# Patient Record
Sex: FEMALE | Race: WHITE | Employment: FULL TIME | URBAN - METROPOLITAN AREA
[De-identification: names, ages, dates, MRNs, and addresses within clinical notes are randomized per-mention and may not be internally consistent; named-entity substitution may affect disease eponyms.]

---

## 2023-06-19 LAB
ALANINE AMINOTRANSFERASE (SGPT) (U/L) IN SER/PLAS: 60 U/L (ref 7–45)
ALBUMIN (G/DL) IN SER/PLAS: 4.5 G/DL (ref 3.4–5)
ALKALINE PHOSPHATASE (U/L) IN SER/PLAS: 68 U/L (ref 33–136)
ANION GAP IN SER/PLAS: 12 MMOL/L (ref 10–20)
ASPARTATE AMINOTRANSFERASE (SGOT) (U/L) IN SER/PLAS: 43 U/L (ref 9–39)
BILIRUBIN TOTAL (MG/DL) IN SER/PLAS: 1.4 MG/DL (ref 0–1.2)
CALCIUM (MG/DL) IN SER/PLAS: 10 MG/DL (ref 8.6–10.6)
CARBON DIOXIDE, TOTAL (MMOL/L) IN SER/PLAS: 28 MMOL/L (ref 21–32)
CHLORIDE (MMOL/L) IN SER/PLAS: 103 MMOL/L (ref 98–107)
CHOLESTEROL (MG/DL) IN SER/PLAS: 171 MG/DL (ref 0–199)
CHOLESTEROL IN HDL (MG/DL) IN SER/PLAS: 48.5 MG/DL
CHOLESTEROL/HDL RATIO: 3.5
CREATININE (MG/DL) IN SER/PLAS: 0.6 MG/DL (ref 0.5–1.05)
ESTIMATED AVERAGE GLUCOSE FOR HBA1C: 197 MG/DL
GFR FEMALE: >90 ML/MIN/1.73M2
GLUCOSE (MG/DL) IN SER/PLAS: 171 MG/DL (ref 74–99)
HEMOGLOBIN A1C/HEMOGLOBIN TOTAL IN BLOOD: 8.5 %
LDL: 89 MG/DL (ref 0–99)
POTASSIUM (MMOL/L) IN SER/PLAS: 4.3 MMOL/L (ref 3.5–5.3)
PROTEIN TOTAL: 7.2 G/DL (ref 6.4–8.2)
SODIUM (MMOL/L) IN SER/PLAS: 139 MMOL/L (ref 136–145)
TRIGLYCERIDE (MG/DL) IN SER/PLAS: 169 MG/DL (ref 0–149)
UREA NITROGEN (MG/DL) IN SER/PLAS: 16 MG/DL (ref 6–23)
VLDL: 34 MG/DL (ref 0–40)

## 2023-06-20 LAB — THYROTROPIN (MIU/L) IN SER/PLAS BY DETECTION LIMIT <= 0.05 MIU/L: 2.2 MIU/L (ref 0.44–3.98)

## 2023-06-22 LAB
ALBUMIN (MG/L) IN URINE: <7 MG/L
ALBUMIN/CREATININE (UG/MG) IN URINE: NORMAL UG/MG CRT (ref 0–30)
CREATININE (MG/DL) IN URINE: 131 MG/DL (ref 20–320)

## 2023-08-21 ENCOUNTER — HOSPITAL ENCOUNTER (OUTPATIENT)
Dept: DATA CONVERSION | Facility: HOSPITAL | Age: 63
End: 2023-08-21
Payer: COMMERCIAL

## 2023-08-21 DIAGNOSIS — M65.132: ICD-10-CM

## 2023-08-21 DIAGNOSIS — M65.332 TRIGGER FINGER, LEFT MIDDLE FINGER: ICD-10-CM

## 2023-08-28 PROBLEM — E78.5 HLD (HYPERLIPIDEMIA): Status: ACTIVE | Noted: 2023-08-28

## 2023-08-28 PROBLEM — G56.02 CARPAL TUNNEL SYNDROME, LEFT UPPER LIMB: Status: ACTIVE | Noted: 2023-08-28

## 2023-08-28 PROBLEM — G56.01 CARPAL TUNNEL SYNDROME, RIGHT UPPER LIMB: Status: ACTIVE | Noted: 2023-08-28

## 2023-08-28 PROBLEM — G56.22 CUBITAL TUNNEL SYNDROME ON LEFT: Status: ACTIVE | Noted: 2023-08-28

## 2023-08-28 PROBLEM — A49.01 METHICILLIN SUSCEPTIBLE STAPHYLOCOCCUS AUREUS INFECTION: Status: ACTIVE | Noted: 2023-08-28

## 2023-08-28 PROBLEM — E78.5 DYSLIPIDEMIA: Status: ACTIVE | Noted: 2023-08-28

## 2023-08-28 PROBLEM — E66.9 OBESITY: Status: ACTIVE | Noted: 2023-08-28

## 2023-08-28 PROBLEM — L71.9 ROSACEA: Status: ACTIVE | Noted: 2023-08-28

## 2023-08-28 PROBLEM — E11.9 DIABETES MELLITUS TYPE 2, UNCOMPLICATED (MULTI): Status: ACTIVE | Noted: 2023-08-28

## 2023-08-28 PROBLEM — G89.4 CHRONIC PAIN SYNDROME: Status: ACTIVE | Noted: 2023-08-28

## 2023-08-28 PROBLEM — E11.69 DIABETES MELLITUS TYPE 2 IN OBESE: Status: ACTIVE | Noted: 2023-08-28

## 2023-08-28 PROBLEM — I10 HTN (HYPERTENSION), BENIGN: Status: ACTIVE | Noted: 2023-08-28

## 2023-08-28 PROBLEM — M65.4 RADIAL STYLOID TENOSYNOVITIS: Status: ACTIVE | Noted: 2023-08-28

## 2023-08-28 PROBLEM — E66.9 DIABETES MELLITUS TYPE 2 IN OBESE: Status: ACTIVE | Noted: 2023-08-28

## 2023-08-28 RX ORDER — IBUPROFEN 600 MG/1
1 TABLET ORAL 3 TIMES DAILY PRN
COMMUNITY
Start: 2021-11-08 | End: 2023-10-24 | Stop reason: ALTCHOICE

## 2023-08-28 RX ORDER — GABAPENTIN 100 MG/1
100 CAPSULE ORAL
COMMUNITY
Start: 2022-10-31 | End: 2023-10-24 | Stop reason: ALTCHOICE

## 2023-08-28 RX ORDER — BENZONATATE 200 MG/1
200 CAPSULE ORAL 3 TIMES DAILY PRN
COMMUNITY
Start: 2022-03-28 | End: 2023-10-24 | Stop reason: ALTCHOICE

## 2023-08-28 RX ORDER — HYDROCODONE BITARTRATE AND ACETAMINOPHEN 5; 325 MG/1; MG/1
TABLET ORAL
COMMUNITY
Start: 2022-10-31 | End: 2023-10-24 | Stop reason: ALTCHOICE

## 2023-08-28 RX ORDER — BLOOD-GLUCOSE METER
KIT MISCELLANEOUS 2 TIMES DAILY
COMMUNITY

## 2023-08-28 RX ORDER — GABAPENTIN 300 MG/1
1 CAPSULE ORAL 3 TIMES DAILY
COMMUNITY
Start: 2022-12-01 | End: 2023-10-24 | Stop reason: ALTCHOICE

## 2023-08-28 RX ORDER — ACETAMINOPHEN 500 MG
500 TABLET ORAL EVERY 6 HOURS PRN
COMMUNITY
Start: 2021-11-08 | End: 2023-10-24 | Stop reason: ALTCHOICE

## 2023-08-28 RX ORDER — ATORVASTATIN CALCIUM 20 MG/1
1 TABLET, FILM COATED ORAL DAILY
COMMUNITY
Start: 2021-11-30 | End: 2023-11-15 | Stop reason: SDUPTHER

## 2023-08-28 RX ORDER — LOSARTAN POTASSIUM 50 MG/1
1 TABLET ORAL DAILY
COMMUNITY
Start: 2021-11-29 | End: 2023-11-15 | Stop reason: SDUPTHER

## 2023-08-28 RX ORDER — AZELASTINE 1 MG/ML
2 SPRAY, METERED NASAL DAILY
COMMUNITY
Start: 2022-03-28 | End: 2023-10-24 | Stop reason: ALTCHOICE

## 2023-08-28 RX ORDER — GUAIFENESIN 400 MG/1
400 TABLET ORAL EVERY 4 HOURS PRN
COMMUNITY
Start: 2022-03-28 | End: 2023-10-24 | Stop reason: ALTCHOICE

## 2023-08-28 RX ORDER — DULAGLUTIDE 0.75 MG/.5ML
0.75 INJECTION, SOLUTION SUBCUTANEOUS
COMMUNITY
Start: 2022-03-10 | End: 2023-10-19 | Stop reason: ALTCHOICE

## 2023-08-28 RX ORDER — DULAGLUTIDE 1.5 MG/.5ML
1.5 INJECTION, SOLUTION SUBCUTANEOUS
COMMUNITY
Start: 2021-12-27 | End: 2023-10-19 | Stop reason: ALTCHOICE

## 2023-08-28 RX ORDER — FLUTICASONE PROPIONATE 50 MCG
2 SPRAY, SUSPENSION (ML) NASAL DAILY
COMMUNITY
Start: 2022-03-28 | End: 2023-10-24 | Stop reason: ALTCHOICE

## 2023-09-19 PROBLEM — R09.89 CHEST CONGESTION: Status: ACTIVE | Noted: 2023-09-19

## 2023-09-19 PROBLEM — R09.89 CHEST CONGESTION: Status: RESOLVED | Noted: 2023-09-19 | Resolved: 2023-09-19

## 2023-09-19 PROBLEM — E08.3292: Status: ACTIVE | Noted: 2023-09-19

## 2023-09-19 PROBLEM — H25.13 CATARACT, NUCLEAR SCLEROTIC, BOTH EYES: Status: ACTIVE | Noted: 2023-09-19

## 2023-09-19 PROBLEM — E11.9 DIABETES MELLITUS (MULTI): Status: RESOLVED | Noted: 2023-09-19 | Resolved: 2023-09-19

## 2023-09-19 PROBLEM — H25.013 CORTICAL AGE-RELATED CATARACT OF BOTH EYES: Status: ACTIVE | Noted: 2023-09-19

## 2023-09-19 PROBLEM — E11.9 DIABETES MELLITUS (MULTI): Status: ACTIVE | Noted: 2023-09-19

## 2023-09-19 PROBLEM — E08.3292: Status: RESOLVED | Noted: 2023-09-19 | Resolved: 2023-09-19

## 2023-09-19 PROBLEM — H25.13 CATARACT, NUCLEAR SCLEROTIC, BOTH EYES: Status: RESOLVED | Noted: 2023-09-19 | Resolved: 2023-09-19

## 2023-09-19 RX ORDER — TIRZEPATIDE 2.5 MG/.5ML
INJECTION, SOLUTION SUBCUTANEOUS
COMMUNITY
Start: 2023-07-03 | End: 2023-10-19

## 2023-09-19 RX ORDER — SEMAGLUTIDE 0.68 MG/ML
INJECTION, SOLUTION SUBCUTANEOUS
COMMUNITY
Start: 2023-07-11 | End: 2023-11-15 | Stop reason: SDUPTHER

## 2023-10-17 ENCOUNTER — TELEPHONE (OUTPATIENT)
Dept: ENDOCRINOLOGY | Facility: CLINIC | Age: 63
End: 2023-10-17

## 2023-10-17 ENCOUNTER — LAB (OUTPATIENT)
Dept: LAB | Facility: LAB | Age: 63
End: 2023-10-17
Payer: COMMERCIAL

## 2023-10-17 DIAGNOSIS — E11.9 TYPE 2 DIABETES MELLITUS WITHOUT COMPLICATION, UNSPECIFIED WHETHER LONG TERM INSULIN USE (MULTI): ICD-10-CM

## 2023-10-17 DIAGNOSIS — E11.9 TYPE 2 DIABETES MELLITUS WITHOUT COMPLICATION, WITHOUT LONG-TERM CURRENT USE OF INSULIN (MULTI): ICD-10-CM

## 2023-10-17 DIAGNOSIS — E11.9 TYPE 2 DIABETES MELLITUS WITHOUT COMPLICATION, WITHOUT LONG-TERM CURRENT USE OF INSULIN (MULTI): Primary | ICD-10-CM

## 2023-10-17 LAB
ALBUMIN SERPL BCP-MCNC: 4.8 G/DL (ref 3.4–5)
ALP SERPL-CCNC: 53 U/L (ref 33–136)
ALT SERPL W P-5'-P-CCNC: 37 U/L (ref 7–45)
ANION GAP SERPL CALC-SCNC: 14 MMOL/L (ref 10–20)
AST SERPL W P-5'-P-CCNC: 33 U/L (ref 9–39)
BILIRUB SERPL-MCNC: 1.1 MG/DL (ref 0–1.2)
BUN SERPL-MCNC: 15 MG/DL (ref 6–23)
CALCIUM SERPL-MCNC: 10.3 MG/DL (ref 8.6–10.6)
CHLORIDE SERPL-SCNC: 102 MMOL/L (ref 98–107)
CHOLEST SERPL-MCNC: 144 MG/DL (ref 0–199)
CHOLESTEROL/HDL RATIO: 2.8
CO2 SERPL-SCNC: 28 MMOL/L (ref 21–32)
CREAT SERPL-MCNC: 0.71 MG/DL (ref 0.5–1.05)
EST. AVERAGE GLUCOSE BLD GHB EST-MCNC: 111 MG/DL
GFR SERPL CREATININE-BSD FRML MDRD: >90 ML/MIN/1.73M*2
GLUCOSE SERPL-MCNC: 98 MG/DL (ref 74–99)
HBA1C MFR BLD: 5.5 %
HDLC SERPL-MCNC: 50.8 MG/DL
LDLC SERPL CALC-MCNC: 68 MG/DL
NON HDL CHOLESTEROL: 93 MG/DL (ref 0–149)
POTASSIUM SERPL-SCNC: 4.5 MMOL/L (ref 3.5–5.3)
PROT SERPL-MCNC: 7.7 G/DL (ref 6.4–8.2)
SODIUM SERPL-SCNC: 139 MMOL/L (ref 136–145)
TRIGL SERPL-MCNC: 127 MG/DL (ref 0–149)
VLDL: 25 MG/DL (ref 0–40)

## 2023-10-17 PROCEDURE — 80061 LIPID PANEL: CPT

## 2023-10-17 PROCEDURE — 36415 COLL VENOUS BLD VENIPUNCTURE: CPT

## 2023-10-17 PROCEDURE — 83036 HEMOGLOBIN GLYCOSYLATED A1C: CPT

## 2023-10-17 PROCEDURE — 80053 COMPREHEN METABOLIC PANEL: CPT

## 2023-10-17 NOTE — TELEPHONE ENCOUNTER
Patient contacted  requesting lab orderes for upcoming appointment , Labs pended to provider . Gina Spears LPN

## 2023-10-19 ENCOUNTER — OFFICE VISIT (OUTPATIENT)
Dept: ENDOCRINOLOGY | Facility: CLINIC | Age: 63
End: 2023-10-19
Payer: COMMERCIAL

## 2023-10-19 VITALS
SYSTOLIC BLOOD PRESSURE: 111 MMHG | HEART RATE: 72 BPM | DIASTOLIC BLOOD PRESSURE: 70 MMHG | HEIGHT: 63 IN | WEIGHT: 166.8 LBS | BODY MASS INDEX: 29.55 KG/M2

## 2023-10-19 DIAGNOSIS — E78.5 DYSLIPIDEMIA: ICD-10-CM

## 2023-10-19 DIAGNOSIS — E11.9 TYPE 2 DIABETES MELLITUS WITHOUT COMPLICATION, WITHOUT LONG-TERM CURRENT USE OF INSULIN (MULTI): Primary | ICD-10-CM

## 2023-10-19 PROCEDURE — 1036F TOBACCO NON-USER: CPT | Performed by: INTERNAL MEDICINE

## 2023-10-19 PROCEDURE — 4010F ACE/ARB THERAPY RXD/TAKEN: CPT | Performed by: INTERNAL MEDICINE

## 2023-10-19 PROCEDURE — 3044F HG A1C LEVEL LT 7.0%: CPT | Performed by: INTERNAL MEDICINE

## 2023-10-19 PROCEDURE — 99214 OFFICE O/P EST MOD 30 MIN: CPT | Performed by: INTERNAL MEDICINE

## 2023-10-19 PROCEDURE — 3048F LDL-C <100 MG/DL: CPT | Performed by: INTERNAL MEDICINE

## 2023-10-19 PROCEDURE — 3074F SYST BP LT 130 MM HG: CPT | Performed by: INTERNAL MEDICINE

## 2023-10-19 PROCEDURE — 95251 CONT GLUC MNTR ANALYSIS I&R: CPT | Performed by: INTERNAL MEDICINE

## 2023-10-19 PROCEDURE — 3078F DIAST BP <80 MM HG: CPT | Performed by: INTERNAL MEDICINE

## 2023-10-19 NOTE — PROGRESS NOTES
Consults    Reason For Consult  Diabetes     History Of Present Illness  Shira Castaneda is a 63 y.o. female presenting with diabetes .    She ha sbeen doing very well    she had an eventful surgery , and her recovery was challenging   She had two more surgeries since last visit\      she had hand surgery and multiple infection since September 2022   during all this time his BG has been between 120-160 BG   BG challenging, in some times     multipe sx since due to repeated infections , originally with different bacterias. required even IV antb therapy at home as well ,   Bgare much improved with her hard work          this was from she has an incidental 4, injured to her arm  She did not break any bones   she did see ophthalmology without any known retinopathy in june 2023   She was able to be seen by her primary care physician received Covid booster received flu vaccine  She also did her mammogram as well as all her blood test in the system reviewed,     she is tolerating ozempic     She is eating healthy      her history includes       Type 2 diabetes for many years , she has had reaction to many different drugs, she could not      tolerated Actos. , she could not tolerate metformin      Hyperlipidemia. She had reaction to Crestor but using atorvastatin 20 mg daily      HTN , being treated currently  by PCP             Home Management    Results from Most Recent A1C  Hemoglobin A1C   Date/Time Value Ref Range Status   10/17/2023 01:28 PM 5.5 see below % Final        Diabetes Problem List Entries with Dates  Problem List:  2023-09: Diabetes mellitus due to underlying condition with mild   nonproliferative diabetic retinopathy without macular edema, left eye   (CMS/[facility])  2023-09: Diabetes mellitus (CMS/[facility])  2023-08: Diabetes mellitus type 2 in obese (CMS/[facility])  2023-08: Diabetes mellitus type 2, uncomplicated (CMS/[facility])      History of DKA with Dates:   This is not able to automated, and will cause the clinician to  review the entire history of patient. Solved, need to look at History of Diabetes Problem List. Includes resolved entries. Clinician can look above and enter the count.      History where DKA was Reason for Admission in last year no      NOTE: Anything under this line is for testing purposes only       Any family history of thyroid cancer? no  Any personal history of radiation to your head/neck? no    Past Medical History  She has a past medical history of Essential (primary) hypertension (01/05/2023), Hyperlipidemia, unspecified (01/07/2023), and Type 2 diabetes mellitus without complications (CMS/HCC) (01/05/2023).    Surgical History  She has a past surgical history that includes Other surgical history (10/19/2020); Other surgical history (01/13/2021); and Other surgical history (01/13/2021).     Social History  She reports that she has never smoked. She has never used smokeless tobacco. She reports that she does not drink alcohol and does not use drugs.    Family History  Family History   Problem Relation Name Age of Onset    Diabetes Mother      Cancer Mother      Cancer Sister      Diabetes Brother          Allergies  Dapagliflozin, Dapagliflozin propanediol, Metformin, Pioglitazone, and Rosuvastatin    Review of Systems    Past Medical History:   Diagnosis Date    Essential (primary) hypertension 01/05/2023    HTN (hypertension), benign    Hyperlipidemia, unspecified 01/07/2023    HLD (hyperlipidemia)    Type 2 diabetes mellitus without complications (CMS/HCC) 01/05/2023    Diabetes mellitus type 2, uncomplicated       Past Surgical History:   Procedure Laterality Date    OTHER SURGICAL HISTORY  10/19/2020    Cholecystectomy    OTHER SURGICAL HISTORY  01/13/2021    Abdominoplasty    OTHER SURGICAL HISTORY  01/13/2021    Tubal ligation       Social History     Socioeconomic History    Marital status:      Spouse name: Not on file    Number of children: Not on file    Years of education: Not on file     "Highest education level: Not on file   Occupational History    Not on file   Tobacco Use    Smoking status: Never    Smokeless tobacco: Never   Substance and Sexual Activity    Alcohol use: Never    Drug use: Never    Sexual activity: Not on file   Other Topics Concern    Not on file   Social History Narrative    Not on file     Social Determinants of Health     Financial Resource Strain: Not on file   Food Insecurity: Not on file   Transportation Needs: Not on file   Physical Activity: Not on file   Stress: Not on file   Social Connections: Not on file   Intimate Partner Violence: Not on file   Housing Stability: Not on file        Physical Exam     ROS, PMH, FH/SH, surgical history and allergies have been reviewed.    Last Recorded Vitals  Blood pressure 111/70, pulse 72, height 1.6 m (5' 3\"), weight 75.7 kg (166 lb 12.8 oz).    Relevant Results  Results from last 7 days   Lab Units 10/17/23  1328   GLUCOSE mg/dL 98        If you would like to pull in Medications, type .meds     If you would like to pull in Lab results for the last 24 hours, type .[id number]    If you would like to pull in specific Lab results, type .ll     If you would like to pull in Imaging results, type .imgrslt :99}  Lab Review  Lab Results   Component Value Date    BILITOT 1.1 10/17/2023    CALCIUM 10.3 10/17/2023    CO2 28 10/17/2023     10/17/2023    CREATININE 0.71 10/17/2023    GLUCOSE 98 10/17/2023    ALKPHOS 53 10/17/2023    K 4.5 10/17/2023    PROT 7.7 10/17/2023     10/17/2023    AST 33 10/17/2023    ALT 37 10/17/2023    BUN 15 10/17/2023    ANIONGAP 14 10/17/2023    MG 1.9 12/06/2022    PHOS 2.5 12/06/2022    ALBUMIN 4.8 10/17/2023    GFRF >90 06/19/2023     Lab Results   Component Value Date    TRIG 127 10/17/2023    CHOL 144 10/17/2023    LDLCALC 68 10/17/2023    HDL 50.8 10/17/2023     Lab Results   Component Value Date    HGBA1C 5.5 10/17/2023    HGBA1C 8.5 (A) 06/19/2023    HGBA1C 7.1 (H) 12/06/2022     The 10-year " ASCVD risk score (Evan DENTON, et al., 2019) is: 6.6%    Values used to calculate the score:      Age: 63 years      Sex: Female      Is Non- : No      Diabetic: Yes      Tobacco smoker: No      Systolic Blood Pressure: 111 mmHg      Is BP treated: Yes      HDL Cholesterol: 50.8 mg/dL      Total Cholesterol: 144 mg/dL       Assessment/Plan   Problem List Items Addressed This Visit    None         Assessed    · HTN (hypertension), benign (401.1) (I10)   · HLD (hyperlipidemia) (272.4) (E78.5)   · Diabetes mellitus type 2, uncomplicated (250.00) (E11.9)          (E11.9) Type 2 diabetes mellitus without complication,   uncontrolled ,     We tried mounjaro, but the insurance did not approve     On her ozempic 0.5 mg weekly  Her cgm reviewed     She is up-to-date with her eye exam which did not show any retinopathy, she has seen Ortho and foot exam was done as a part during her evaluation  Hyperlipidemia currently controlled is acceptable from the review of her labs                 (E78.5) Hyperlipidemia, unspecified hyperlipidemia type      Comment:      , lipitor 20 mg .. She is tolerating      well so far                    Her screenings are up-to-date      , Blood pressure controlled              2. Essential hypertension, benign            - losartan (COZAAR) 50 mg tablet; Take 1 tablet by mouth once daily. For      blood pressure. Dispense: 90 tablet; Refill: 3              Dr. Sheldon Perez  Professor of Medicine,[facility]  Director, [facility]      Sheldon Perez MD

## 2023-10-19 NOTE — PROGRESS NOTES
"CGM Interpretation    Average blood glucose: 121 mg/dl  CGM BG Standard Deviation:  CGM BG Glucose Variability:  13.6%  Consistent current A1c:  6.2% %  99 % of time worn spent at target 70 - 180 mg/dL  Overall trend and \"glucose pattern insights\" reveals : No current concern for recurrent hypoglycemia  Overall Glucose : generally stable near average    CGM data was used to influence glucose control treatment plan  Minimum of 72 hours of data were reviewed                        "

## 2023-10-20 ENCOUNTER — PREP FOR PROCEDURE (OUTPATIENT)
Dept: ORTHOPEDIC SURGERY | Facility: HOSPITAL | Age: 63
End: 2023-10-20
Payer: COMMERCIAL

## 2023-10-20 RX ORDER — SODIUM CHLORIDE, SODIUM LACTATE, POTASSIUM CHLORIDE, CALCIUM CHLORIDE 600; 310; 30; 20 MG/100ML; MG/100ML; MG/100ML; MG/100ML
100 INJECTION, SOLUTION INTRAVENOUS CONTINUOUS
Status: CANCELLED | OUTPATIENT
Start: 2023-10-20

## 2023-10-24 ENCOUNTER — CLINICAL SUPPORT (OUTPATIENT)
Dept: PREADMISSION TESTING | Facility: HOSPITAL | Age: 63
End: 2023-10-24
Payer: COMMERCIAL

## 2023-10-24 ENCOUNTER — ANCILLARY PROCEDURE (OUTPATIENT)
Dept: PREADMISSION TESTING | Facility: HOSPITAL | Age: 63
End: 2023-10-24

## 2023-10-24 VITALS
BODY MASS INDEX: 30.86 KG/M2 | SYSTOLIC BLOOD PRESSURE: 123 MMHG | TEMPERATURE: 96.9 F | WEIGHT: 174.16 LBS | OXYGEN SATURATION: 99 % | RESPIRATION RATE: 16 BRPM | DIASTOLIC BLOOD PRESSURE: 76 MMHG | HEART RATE: 77 BPM | HEIGHT: 63 IN

## 2023-10-24 DIAGNOSIS — E11.9 TYPE 2 DIABETES MELLITUS WITHOUT COMPLICATION, WITHOUT LONG-TERM CURRENT USE OF INSULIN (MULTI): ICD-10-CM

## 2023-10-24 DIAGNOSIS — Z01.818 PREOPERATIVE TESTING: Primary | ICD-10-CM

## 2023-10-24 DIAGNOSIS — Z01.818 PREOPERATIVE TESTING: ICD-10-CM

## 2023-10-24 LAB
ERYTHROCYTE [DISTWIDTH] IN BLOOD BY AUTOMATED COUNT: 12.5 % (ref 11.5–14.5)
HCT VFR BLD AUTO: 43.4 % (ref 36–46)
HGB BLD-MCNC: 14.7 G/DL (ref 12–16)
MCH RBC QN AUTO: 29.5 PG (ref 26–34)
MCHC RBC AUTO-ENTMCNC: 33.9 G/DL (ref 32–36)
MCV RBC AUTO: 87 FL (ref 80–100)
NRBC BLD-RTO: ABNORMAL /100{WBCS}
PLATELET # BLD AUTO: 171 X10*3/UL (ref 150–450)
PMV BLD AUTO: 12 FL (ref 7.5–11.5)
RBC # BLD AUTO: 4.98 X10*6/UL (ref 4–5.2)
WBC # BLD AUTO: 6.2 X10*3/UL (ref 4.4–11.3)

## 2023-10-24 PROCEDURE — 85027 COMPLETE CBC AUTOMATED: CPT

## 2023-10-24 PROCEDURE — 36415 COLL VENOUS BLD VENIPUNCTURE: CPT

## 2023-10-24 PROCEDURE — 93005 ELECTROCARDIOGRAM TRACING: CPT

## 2023-10-24 RX ORDER — BISMUTH SUBSALICYLATE 262 MG
1 TABLET,CHEWABLE ORAL DAILY
COMMUNITY

## 2023-10-24 ASSESSMENT — DUKE ACTIVITY SCORE INDEX (DASI)
TOTAL_SCORE: 42.7
DASI METS SCORE: 8
CAN YOU CLIMB A FLIGHT OF STAIRS OR WALK UP A HILL: YES
CAN YOU PARTICIPATE IN MODERATE RECREATIONAL ACTIVITIES LIKE GOLF, BOWLING, DANCING, DOUBLES TENNIS OR THROWING A BASEBALL OR FOOTBALL: YES
CAN YOU RUN A SHORT DISTANCE: YES
CAN YOU PARTICIPATE IN STRENOUS SPORTS LIKE SWIMMING, SINGLES TENNIS, FOOTBALL, BASKETBALL, OR SKIING: NO
CAN YOU WALK A BLOCK OR TWO ON LEVEL GROUND: YES
CAN YOU DO MODERATE WORK AROUND THE HOUSE LIKE VACUUMING, SWEEPING FLOORS OR CARRYING GROCERIES: YES
CAN YOU TAKE CARE OF YOURSELF (EAT, DRESS, BATHE, OR USE TOILET): YES
CAN YOU WALK INDOORS, SUCH AS AROUND YOUR HOUSE: YES
CAN YOU DO YARD WORK LIKE RAKING LEAVES, WEEDING OR PUSHING A MOWER: YES
CAN YOU DO HEAVY WORK AROUND THE HOUSE LIKE SCRUBBING FLOORS OR LIFTING AND MOVING HEAVY FURNITURE: NO
CAN YOU DO LIGHT WORK AROUND THE HOUSE LIKE DUSTING OR WASHING DISHES: YES
CAN YOU HAVE SEXUAL RELATIONS: YES

## 2023-10-24 ASSESSMENT — CHADS2 SCORE
CHADS2 SCORE: 2
CHF: NO
HYPERTENSION: YES
PRIOR STROKE OR TIA OR THROMBOEMBOLISM: NO
DIABETES: YES
AGE GREATER THAN OR EQUAL TO 75: NO

## 2023-10-24 ASSESSMENT — PAIN - FUNCTIONAL ASSESSMENT: PAIN_FUNCTIONAL_ASSESSMENT: 0-10

## 2023-10-24 ASSESSMENT — PAIN DESCRIPTION - DESCRIPTORS: DESCRIPTORS: ACHING

## 2023-10-24 ASSESSMENT — PAIN SCALES - GENERAL: PAINLEVEL_OUTOF10: 2

## 2023-10-24 NOTE — PREPROCEDURE INSTRUCTIONS
Medication List            Accurate as of October 24, 2023  8:50 AM. Always use your most recent med list.                acetaminophen 500 mg tablet  Commonly known as: Tylenol  Medication Adjustments for Surgery: Other (Comment)  Notes to patient: Not taking     atorvastatin 20 mg tablet  Commonly known as: Lipitor  Medication Adjustments for Surgery: Continue until night before surgery     azelastine 137 mcg (0.1 %) nasal spray  Commonly known as: Astelin  Medication Adjustments for Surgery: Other (Comment)     benzonatate 200 mg capsule  Commonly known as: Tessalon  Medication Adjustments for Surgery: Other (Comment)     fluticasone 50 mcg/actuation nasal spray  Commonly known as: Flonase  Medication Adjustments for Surgery: Other (Comment)     FREESTYLE LANCETS MISC     FreeStyle Lite Strips strip  Generic drug: blood sugar diagnostic     * gabapentin 100 mg capsule  Commonly known as: Neurontin  Medication Adjustments for Surgery: Other (Comment)     * gabapentin 300 mg capsule  Commonly known as: Neurontin  Medication Adjustments for Surgery: Other (Comment)     guaiFENesin 400 mg tablet  Commonly known as: Humibid 3  Medication Adjustments for Surgery: Other (Comment)     HYDROcodone-acetaminophen 5-325 mg tablet  Commonly known as: Norco  Medication Adjustments for Surgery: Other (Comment)     ibuprofen 600 mg tablet  Medication Adjustments for Surgery: Other (Comment)     losartan 50 mg tablet  Commonly known as: Cozaar  Medication Adjustments for Surgery: Continue until night before surgery     multivitamin tablet  Medication Adjustments for Surgery: Stop 7 days before surgery     Ozempic 0.25 mg or 0.5 mg (2 mg/3 mL) pen injector  Generic drug: semaglutide  Medication Adjustments for Surgery: Stop 7 days before surgery           * This list has 2 medication(s) that are the same as other medications prescribed for you. Read the directions carefully, and ask your doctor or other care provider to review  them with you.                                  NPO Instructions:    Do not eat any food after midnight the night before your surgery/procedure.    Additional Instructions:     Seven/Six Days before Surgery:  Review your medication instructions, stop indicated medications  Five Days before Surgery:  Review your medication instructions, stop indicated medications  Three Days before Surgery:  Review your medication instructions, stop indicated medications  The Day before Surgery:  Review your medication instructions, stop indicated medications  You will be contacted regarding the time of your arrival to facility and surgery time  Do not eat any food after Midnight  Day of Surgery:  Review your medication instructions, take indicated medications  If you have diabetes, please check your fasting blood sugar upon awakening.  If fasting blood sugar is <80 mg/dl, drink 100 ml of apple juice, time limit of 2 hours before  Wear  comfortable loose fitting clothing  Do not use moisturizers, creams, lotions or perfume  All jewelry and valuables should be left at home

## 2023-10-24 NOTE — CPM/PAT H&P
CPM/PAT Evaluation       Name: Shira Castaneda (Shira Castaneda)  /Age: 1960/63 y.o.     In-Person       Chief Complaint: Left middle trigger finger, infectious tenosynovitis of left wrist extensor    HPI  Patient is a 64 y/o alert and oriented female coming in for PAT for a left middle trigger finger release, left ring finger tenosynovectomy scheduled on 2023 with Dr Landin. She reports left wrist/hand pain that she rates at a 2/10 and worsens with movement. Patient denies Cx pain, SOB, DIALLO, and NVDC. Patient also denies Hx DVT/PE. Current medications were reviewed and a presurgical medication schedule was provided. He has no questions at this time.    NO PERSONAL REPORTS OF REACTIONS TO ANESTHESIA  NO PERSONAL REPORTS OF FAMILY HISTORY OF REACTIONS TO ANESTHESIA  NO PERSONAL REPORTS OF METAL, NICKEL, OR SHELLFISH ALLERGY  NONSMOKER-NEVER SMOKED  NO ETOH/DRUGS    Past Medical History:   Diagnosis Date    Essential (primary) hypertension 2023    HTN (hypertension), benign    Hyperlipidemia, unspecified 2023    HLD (hyperlipidemia)    Type 2 diabetes mellitus without complications (CMS/[facility]) 2023    Diabetes mellitus type 2, uncomplicated     Past Surgical History:   Procedure Laterality Date    CARPAL TUNNEL RELEASE Left     CHOLECYSTECTOMY      COLONOSCOPY      OTHER SURGICAL HISTORY  10/19/2020    Cholecystectomy    OTHER SURGICAL HISTORY  2021    Abdominoplasty    OTHER SURGICAL HISTORY  2021    Tubal ligation    TUBAL LIGATION       Family History   Problem Relation Name Age of Onset    Diabetes Mother      Cancer Mother      Cancer Sister      Diabetes Brother       Allergies   Allergen Reactions    Dapagliflozin Hives    Dapagliflozin Propanediol Hives    Metformin Other     numbness on side of face    Pioglitazone Hives    Rosuvastatin Unknown     Medication Documentation Review Audit       Reviewed by CHARANJIT Raymundo (Nurse Practitioner) on 10/24/23 at  0904      Medication Order Taking? Sig Documenting Provider Last Dose Status     Discontinued 10/24/23 0903   atorvastatin (Lipitor) 20 mg tablet [id number] Yes Take 1 tablet (20 mg) by mouth once daily. Historical Provider, MD 10/23/2023 Active     Discontinued 10/24/23 0903     Discontinued 10/24/23 0903     Discontinued 10/19/23 1340     Discontinued 10/19/23 1340     Discontinued 10/24/23 0903   FREESTYLE LANCETS MISC [id number]  Test blood sugars twice daily as directed Historical Provider, MD  Active   FreeStyle Lite Strips strip [id number]  twice a day. Historical Provider, MD  Active     Discontinued 10/24/23 0903     Discontinued 10/24/23 0903     Discontinued 10/24/23 0903      Discontinued 10/24/23 0903     Discontinued 10/24/23 0904   losartan (Cozaar) 50 mg tablet [id number] Yes Take 1 tablet (50 mg) by mouth once daily. Srinivas Provider, MD 10/23/2023 Active   multivitamin tablet [id number] Yes Take 1 tablet by mouth once daily. Historical Provider, MD 10/23/2023 Active   semaglutide (Ozempic) 0.25 mg or 0.5 mg (2 mg/3 mL) pen injector [id number] Yes start 0.25 mg a week and in 2 weeks increase to 0.5 mg weekly Historical Provider, MD 10/23/2023 Active     Discontinued 10/19/23 1340                   Review of Systems   Constitutional: Negative for chills, decreased appetite, diaphoresis, fever and malaise/fatigue.   Eyes:  Negative for blurred vision and double vision.   Cardiovascular:  Negative for chest pain, claudication, cyanosis, dyspnea on exertion, irregular heartbeat, leg swelling, near-syncope and palpitations.   Respiratory:  Negative for cough, hemoptysis, shortness of breath and wheezing.    Endocrine: Negative for cold intolerance, heat intolerance, polydipsia, polyphagia and polyuria.   Gastrointestinal:  Negative for abdominal pain, constipation, diarrhea, dysphagia, nausea and vomiting.   Genitourinary:  Negative for bladder incontinence, dysuria, hematuria, incomplete emptying, nocturia,  "pelvic pain and urgency.   Neurological:  Negative for headaches, light-headedness, paresthesias, sensory change and weakness.   Psychiatric/Behavioral:  Negative for altered mental status.       Vitals and nursing note reviewed. Physical exam within normal limits.   Constitutional:       Appearance: Normal appearance.   HENT:      Head: Normocephalic and atraumatic.      Mouth/Throat:      Mouth: Mucous membranes are moist.      Pharynx: Oropharynx is clear.   Eyes:      Extraocular Movements: Extraocular movements intact.      Conjunctiva/sclera: Conjunctivae normal.      Pupils: Pupils are equal, round, and reactive to light.   Cardiovascular:      Rate and Rhythm: Normal rate and regular rhythm.      Pulses: Normal pulses.      Heart sounds: Normal heart sounds.   Pulmonary:      Effort: Pulmonary effort is normal.      Breath sounds: Normal breath sounds.   Abdominal:      General: Abdomen is flat. Bowel sounds are normal.      Palpations: Abdomen is soft.   Musculoskeletal:      Cervical back: Normal range of motion and neck supple.   Skin:     General: Skin is warm and dry.      Capillary Refill: Capillary refill takes less than 2 seconds.   Neurological:      General: No focal deficit present.      Mental Status: She is alert and oriented to person, place, and time. Mental status is at baseline.   Psychiatric:         Mood and Affect: Mood normal.         Behavior: Behavior normal.         Thought Content: Thought content normal.         Judgment: Judgment normal.     PAT AIRWAY:   Airway:     Mallampati::  I    TM distance::  >3 FB    Neck ROM::  Full    Visit Vitals  /76   Pulse 77   Temp 36.1 °C (96.9 °F) (Temporal)   Resp 16   Ht 1.6 m (5' 2.99\")   Wt 79 kg (174 lb 2.6 oz)   SpO2 99%   BMI 30.86 kg/m²   Smoking Status Never   BSA 1.87 m²      EKG COMPLETED IN PAT NSR LOW VOLTAGE QRS RATE 73. NO ACUTE CHANGES  ASYMPTOMATIC WITH GOOD FUNCTIONAL STATUS METS 7.99    DASI Risk Score      Flowsheet Row " Most Recent Value   DASI SCORE 42.7   METS Score (Will be calculated only when all the questions are answered) 8          Caprini DVT Assessment      Flowsheet Row Most Recent Value   DVT Score 4   Current Status Minor surgery planned   Age 60-75 years   BMI 30 or less          Modified Frailty Index      Flowsheet Row Most Recent Value   Modified Frailty Index Calculator .1818          CHADS2 Stroke Risk  Current as of 6 minutes ago        N/A 3 - 100%: High Risk   2 - 3%: Medium Risk   0 - 2%: Low Risk     Last Change: N/A          This score determines the patient's risk of having a stroke if the patient has atrial fibrillation.        This score is not applicable to this patient. Components are not calculated.          Revised Cardiac Risk Index      Flowsheet Row Most Recent Value   Revised Cardiac Risk Calculator 0          Apfel Simplified Score    No data to display       Risk Analysis Index Results This Encounter    No data found in the last 1 encounters.       Stop Bang Score      Flowsheet Row Most Recent Value   Do you snore loudly? 0   Do you often feel tired or fatigued after your sleep? 0   Has anyone ever observed you stop breathing in your sleep? 0   Do you have or are you being treated for high blood pressure? 1   Recent BMI (Calculated) 30.9   Is BMI greater than 35 kg/m2? 0=No   Age older than 50 years old? 1=Yes   Is your neck circumference greater than 17 inches (Male) or 16 inches (Female)? 0            Assessment and Plan:     Left middle trigger finger, infectious tenosynovitis of left wrist extensor-left middle trigger finger release, left ring finger tenosynovectomy scheduled on 11/1/2023 with Dr Landin.     Hypertension-Managed with losartan (Cozaar) 50 mg tablet. BP in /76    A3KV-[id number] with semaglutide (Ozempic) 0.25 mg or 0.5 mg (2 mg/3 mL) pen injector. Last HBA1C completed 10/17/2023 resulted 5.5%    Obesity-BMI 30.86    Preoperative risk assessment  ASA II  RCRI-0 POINTS  CLASS I RISK 3.9%  STOP-BANGS-2 POINTS LOW RISK FOR GWEN  NSQIP-PREDICTED LENGTH OF STAY 0 DAYS  ARISCAT-3 POINTS LOW RISK 1.6%  DASI-42.7 POINTS. 7.99 METS  ZAKI-0.1%  JHFRAT-4 POINTS LOW RISK FOR FALLS  CLEARANCE-NA  PAT TESTING-CBC, EKG. CMP AND HBA1C COMPLETED 10/17/2023, STABLE    *CLEARED FOR SURGERY PENDING LABS/EKG -LABS REVIEWED, STABLE. OK TO PROCEED.     *FACE TO FACE TIME 20 MINUTES.

## 2023-10-24 NOTE — H&P (VIEW-ONLY)
CPM/PAT Evaluation       Name: Shira Castaneda (Shira Castaneda)  /Age: 1960/63 y.o.     In-Person       Chief Complaint: Left middle trigger finger, infectious tenosynovitis of left wrist extensor    HPI  Patient is a 64 y/o alert and oriented female coming in for PAT for a left middle trigger finger release, left ring finger tenosynovectomy scheduled on 2023 with Dr Landin. She reports left wrist/hand pain that she rates at a 2/10 and worsens with movement. Patient denies Cx pain, SOB, DIALLO, and NVDC. Patient also denies Hx DVT/PE. Current medications were reviewed and a presurgical medication schedule was provided. He has no questions at this time.    NO PERSONAL REPORTS OF REACTIONS TO ANESTHESIA  NO PERSONAL REPORTS OF FAMILY HISTORY OF REACTIONS TO ANESTHESIA  NO PERSONAL REPORTS OF METAL, NICKEL, OR SHELLFISH ALLERGY  NONSMOKER-NEVER SMOKED  NO ETOH/DRUGS    Past Medical History:   Diagnosis Date    Essential (primary) hypertension 2023    HTN (hypertension), benign    Hyperlipidemia, unspecified 2023    HLD (hyperlipidemia)    Type 2 diabetes mellitus without complications (CMS/[facility]) 2023    Diabetes mellitus type 2, uncomplicated     Past Surgical History:   Procedure Laterality Date    CARPAL TUNNEL RELEASE Left     CHOLECYSTECTOMY      COLONOSCOPY      OTHER SURGICAL HISTORY  10/19/2020    Cholecystectomy    OTHER SURGICAL HISTORY  2021    Abdominoplasty    OTHER SURGICAL HISTORY  2021    Tubal ligation    TUBAL LIGATION       Family History   Problem Relation Name Age of Onset    Diabetes Mother      Cancer Mother      Cancer Sister      Diabetes Brother       Allergies   Allergen Reactions    Dapagliflozin Hives    Dapagliflozin Propanediol Hives    Metformin Other     numbness on side of face    Pioglitazone Hives    Rosuvastatin Unknown     Medication Documentation Review Audit       Reviewed by CHARANJIT Raymundo (Nurse Practitioner) on 10/24/23 at  0904      Medication Order Taking? Sig Documenting Provider Last Dose Status     Discontinued 10/24/23 0903   atorvastatin (Lipitor) 20 mg tablet [id number] Yes Take 1 tablet (20 mg) by mouth once daily. Historical Provider, MD 10/23/2023 Active     Discontinued 10/24/23 0903     Discontinued 10/24/23 0903     Discontinued 10/19/23 1340     Discontinued 10/19/23 1340     Discontinued 10/24/23 0903   FREESTYLE LANCETS MISC [id number]  Test blood sugars twice daily as directed Historical Provider, MD  Active   FreeStyle Lite Strips strip [id number]  twice a day. Historical Provider, MD  Active     Discontinued 10/24/23 0903     Discontinued 10/24/23 0903     Discontinued 10/24/23 0903      Discontinued 10/24/23 0903     Discontinued 10/24/23 0904   losartan (Cozaar) 50 mg tablet [id number] Yes Take 1 tablet (50 mg) by mouth once daily. Srinivas Provider, MD 10/23/2023 Active   multivitamin tablet [id number] Yes Take 1 tablet by mouth once daily. Historical Provider, MD 10/23/2023 Active   semaglutide (Ozempic) 0.25 mg or 0.5 mg (2 mg/3 mL) pen injector [id number] Yes start 0.25 mg a week and in 2 weeks increase to 0.5 mg weekly Historical Provider, MD 10/23/2023 Active     Discontinued 10/19/23 1340                   Review of Systems   Constitutional: Negative for chills, decreased appetite, diaphoresis, fever and malaise/fatigue.   Eyes:  Negative for blurred vision and double vision.   Cardiovascular:  Negative for chest pain, claudication, cyanosis, dyspnea on exertion, irregular heartbeat, leg swelling, near-syncope and palpitations.   Respiratory:  Negative for cough, hemoptysis, shortness of breath and wheezing.    Endocrine: Negative for cold intolerance, heat intolerance, polydipsia, polyphagia and polyuria.   Gastrointestinal:  Negative for abdominal pain, constipation, diarrhea, dysphagia, nausea and vomiting.   Genitourinary:  Negative for bladder incontinence, dysuria, hematuria, incomplete emptying, nocturia,  "pelvic pain and urgency.   Neurological:  Negative for headaches, light-headedness, paresthesias, sensory change and weakness.   Psychiatric/Behavioral:  Negative for altered mental status.       Vitals and nursing note reviewed. Physical exam within normal limits.   Constitutional:       Appearance: Normal appearance.   HENT:      Head: Normocephalic and atraumatic.      Mouth/Throat:      Mouth: Mucous membranes are moist.      Pharynx: Oropharynx is clear.   Eyes:      Extraocular Movements: Extraocular movements intact.      Conjunctiva/sclera: Conjunctivae normal.      Pupils: Pupils are equal, round, and reactive to light.   Cardiovascular:      Rate and Rhythm: Normal rate and regular rhythm.      Pulses: Normal pulses.      Heart sounds: Normal heart sounds.   Pulmonary:      Effort: Pulmonary effort is normal.      Breath sounds: Normal breath sounds.   Abdominal:      General: Abdomen is flat. Bowel sounds are normal.      Palpations: Abdomen is soft.   Musculoskeletal:      Cervical back: Normal range of motion and neck supple.   Skin:     General: Skin is warm and dry.      Capillary Refill: Capillary refill takes less than 2 seconds.   Neurological:      General: No focal deficit present.      Mental Status: She is alert and oriented to person, place, and time. Mental status is at baseline.   Psychiatric:         Mood and Affect: Mood normal.         Behavior: Behavior normal.         Thought Content: Thought content normal.         Judgment: Judgment normal.     PAT AIRWAY:   Airway:     Mallampati::  I    TM distance::  >3 FB    Neck ROM::  Full    Visit Vitals  /76   Pulse 77   Temp 36.1 °C (96.9 °F) (Temporal)   Resp 16   Ht 1.6 m (5' 2.99\")   Wt 79 kg (174 lb 2.6 oz)   SpO2 99%   BMI 30.86 kg/m²   Smoking Status Never   BSA 1.87 m²      EKG COMPLETED IN PAT NSR LOW VOLTAGE QRS RATE 73. NO ACUTE CHANGES  ASYMPTOMATIC WITH GOOD FUNCTIONAL STATUS METS 7.99    DASI Risk Score      Flowsheet Row " Most Recent Value   DASI SCORE 42.7   METS Score (Will be calculated only when all the questions are answered) 8          Caprini DVT Assessment      Flowsheet Row Most Recent Value   DVT Score 4   Current Status Minor surgery planned   Age 60-75 years   BMI 30 or less          Modified Frailty Index      Flowsheet Row Most Recent Value   Modified Frailty Index Calculator .1818          CHADS2 Stroke Risk  Current as of 6 minutes ago        N/A 3 - 100%: High Risk   2 - 3%: Medium Risk   0 - 2%: Low Risk     Last Change: N/A          This score determines the patient's risk of having a stroke if the patient has atrial fibrillation.        This score is not applicable to this patient. Components are not calculated.          Revised Cardiac Risk Index      Flowsheet Row Most Recent Value   Revised Cardiac Risk Calculator 0          Apfel Simplified Score    No data to display       Risk Analysis Index Results This Encounter    No data found in the last 1 encounters.       Stop Bang Score      Flowsheet Row Most Recent Value   Do you snore loudly? 0   Do you often feel tired or fatigued after your sleep? 0   Has anyone ever observed you stop breathing in your sleep? 0   Do you have or are you being treated for high blood pressure? 1   Recent BMI (Calculated) 30.9   Is BMI greater than 35 kg/m2? 0=No   Age older than 50 years old? 1=Yes   Is your neck circumference greater than 17 inches (Male) or 16 inches (Female)? 0            Assessment and Plan:     Left middle trigger finger, infectious tenosynovitis of left wrist extensor-left middle trigger finger release, left ring finger tenosynovectomy scheduled on 11/1/2023 with Dr Landin.     Hypertension-Managed with losartan (Cozaar) 50 mg tablet. BP in /76    [id number] with semaglutide (Ozempic) 0.25 mg or 0.5 mg (2 mg/3 mL) pen injector. Last HBA1C completed 10/17/2023 resulted 5.5%    Obesity-BMI 30.86    Preoperative risk assessment  ASA II  RCRI-0 POINTS  CLASS I RISK 3.9%  STOP-BANGS-2 POINTS LOW RISK FOR GWEN  NSQIP-PREDICTED LENGTH OF STAY 0 DAYS  ARISCAT-3 POINTS LOW RISK 1.6%  DASI-42.7 POINTS. 7.99 METS  ZAKI-0.1%  JHFRAT-4 POINTS LOW RISK FOR FALLS  CLEARANCE-NA  PAT TESTING-CBC, EKG. CMP AND HBA1C COMPLETED 10/17/2023, STABLE    *CLEARED FOR SURGERY PENDING LABS/EKG -LABS REVIEWED, STABLE. OK TO PROCEED.     *FACE TO FACE TIME 20 MINUTES.

## 2023-10-30 ENCOUNTER — ANESTHESIA EVENT (OUTPATIENT)
Dept: OPERATING ROOM | Facility: HOSPITAL | Age: 63
End: 2023-10-30
Payer: COMMERCIAL

## 2023-11-01 ENCOUNTER — ANESTHESIA (OUTPATIENT)
Dept: OPERATING ROOM | Facility: HOSPITAL | Age: 63
End: 2023-11-01
Payer: COMMERCIAL

## 2023-11-01 ENCOUNTER — HOSPITAL ENCOUNTER (OUTPATIENT)
Facility: HOSPITAL | Age: 63
Setting detail: OUTPATIENT SURGERY
Discharge: HOME | End: 2023-11-01
Attending: ORTHOPAEDIC SURGERY | Admitting: ORTHOPAEDIC SURGERY
Payer: COMMERCIAL

## 2023-11-01 VITALS
HEIGHT: 63 IN | HEART RATE: 65 BPM | OXYGEN SATURATION: 98 % | WEIGHT: 169.53 LBS | TEMPERATURE: 97.9 F | SYSTOLIC BLOOD PRESSURE: 126 MMHG | BODY MASS INDEX: 30.04 KG/M2 | DIASTOLIC BLOOD PRESSURE: 75 MMHG | RESPIRATION RATE: 16 BRPM

## 2023-11-01 DIAGNOSIS — A49.01 METHICILLIN SUSCEPTIBLE STAPHYLOCOCCUS AUREUS INFECTION: ICD-10-CM

## 2023-11-01 DIAGNOSIS — M65.331 ACQUIRED TRIGGER FINGER OF RIGHT MIDDLE FINGER: Primary | ICD-10-CM

## 2023-11-01 LAB — GLUCOSE BLD MANUAL STRIP-MCNC: 98 MG/DL (ref 74–99)

## 2023-11-01 PROCEDURE — 2500000004 HC RX 250 GENERAL PHARMACY W/ HCPCS (ALT 636 FOR OP/ED): Performed by: PHYSICIAN ASSISTANT

## 2023-11-01 PROCEDURE — 2500000004 HC RX 250 GENERAL PHARMACY W/ HCPCS (ALT 636 FOR OP/ED): Performed by: ANESTHESIOLOGY

## 2023-11-01 PROCEDURE — 7100000001 HC RECOVERY ROOM TIME - INITIAL BASE CHARGE: Performed by: ORTHOPAEDIC SURGERY

## 2023-11-01 PROCEDURE — 3600000008 HC OR TIME - EACH INCREMENTAL 1 MINUTE - PROCEDURE LEVEL THREE: Performed by: ORTHOPAEDIC SURGERY

## 2023-11-01 PROCEDURE — 82947 ASSAY GLUCOSE BLOOD QUANT: CPT

## 2023-11-01 PROCEDURE — 26145 TENDON EXCISION PALM/FINGER: CPT | Performed by: PHYSICIAN ASSISTANT

## 2023-11-01 PROCEDURE — 2500000005 HC RX 250 GENERAL PHARMACY W/O HCPCS: Performed by: NURSE ANESTHETIST, CERTIFIED REGISTERED

## 2023-11-01 PROCEDURE — A26055 PR INCISE FINGER TENDON SHEATH: Performed by: ANESTHESIOLOGY

## 2023-11-01 PROCEDURE — 26145 TENDON EXCISION PALM/FINGER: CPT | Performed by: ORTHOPAEDIC SURGERY

## 2023-11-01 PROCEDURE — 3700000001 HC GENERAL ANESTHESIA TIME - INITIAL BASE CHARGE: Performed by: ORTHOPAEDIC SURGERY

## 2023-11-01 PROCEDURE — 7100000002 HC RECOVERY ROOM TIME - EACH INCREMENTAL 1 MINUTE: Performed by: ORTHOPAEDIC SURGERY

## 2023-11-01 PROCEDURE — A26055 PR INCISE FINGER TENDON SHEATH: Performed by: NURSE ANESTHETIST, CERTIFIED REGISTERED

## 2023-11-01 PROCEDURE — 3700000002 HC GENERAL ANESTHESIA TIME - EACH INCREMENTAL 1 MINUTE: Performed by: ORTHOPAEDIC SURGERY

## 2023-11-01 PROCEDURE — 3600000003 HC OR TIME - INITIAL BASE CHARGE - PROCEDURE LEVEL THREE: Performed by: ORTHOPAEDIC SURGERY

## 2023-11-01 PROCEDURE — 26180 REMOVAL OF FINGER TENDON: CPT | Performed by: ORTHOPAEDIC SURGERY

## 2023-11-01 PROCEDURE — 7100000010 HC PHASE TWO TIME - EACH INCREMENTAL 1 MINUTE: Performed by: ORTHOPAEDIC SURGERY

## 2023-11-01 PROCEDURE — 2500000001 HC RX 250 WO HCPCS SELF ADMINISTERED DRUGS (ALT 637 FOR MEDICARE OP): Performed by: ORTHOPAEDIC SURGERY

## 2023-11-01 PROCEDURE — 2500000004 HC RX 250 GENERAL PHARMACY W/ HCPCS (ALT 636 FOR OP/ED): Performed by: NURSE ANESTHETIST, CERTIFIED REGISTERED

## 2023-11-01 PROCEDURE — 26180 REMOVAL OF FINGER TENDON: CPT | Performed by: PHYSICIAN ASSISTANT

## 2023-11-01 PROCEDURE — 2500000005 HC RX 250 GENERAL PHARMACY W/O HCPCS: Performed by: ORTHOPAEDIC SURGERY

## 2023-11-01 PROCEDURE — 7100000009 HC PHASE TWO TIME - INITIAL BASE CHARGE: Performed by: ORTHOPAEDIC SURGERY

## 2023-11-01 RX ORDER — ONDANSETRON HYDROCHLORIDE 2 MG/ML
INJECTION, SOLUTION INTRAVENOUS AS NEEDED
Status: DISCONTINUED | OUTPATIENT
Start: 2023-11-01 | End: 2023-11-01

## 2023-11-01 RX ORDER — GLYCOPYRROLATE 0.2 MG/ML
INJECTION INTRAMUSCULAR; INTRAVENOUS AS NEEDED
Status: DISCONTINUED | OUTPATIENT
Start: 2023-11-01 | End: 2023-11-01

## 2023-11-01 RX ORDER — CEFAZOLIN SODIUM 2 G/100ML
2 INJECTION, SOLUTION INTRAVENOUS ONCE
Status: COMPLETED | OUTPATIENT
Start: 2023-11-01 | End: 2023-11-01

## 2023-11-01 RX ORDER — LIDOCAINE HYDROCHLORIDE 10 MG/ML
INJECTION INFILTRATION; PERINEURAL AS NEEDED
Status: DISCONTINUED | OUTPATIENT
Start: 2023-11-01 | End: 2023-11-01 | Stop reason: HOSPADM

## 2023-11-01 RX ORDER — IPRATROPIUM BROMIDE 0.5 MG/2.5ML
500 SOLUTION RESPIRATORY (INHALATION) EVERY 30 MIN PRN
Status: DISCONTINUED | OUTPATIENT
Start: 2023-11-01 | End: 2023-11-01 | Stop reason: HOSPADM

## 2023-11-01 RX ORDER — PROPOFOL 10 MG/ML
INJECTION, EMULSION INTRAVENOUS AS NEEDED
Status: DISCONTINUED | OUTPATIENT
Start: 2023-11-01 | End: 2023-11-01

## 2023-11-01 RX ORDER — CEPHALEXIN 500 MG/1
500 CAPSULE ORAL 3 TIMES DAILY
Qty: 42 CAPSULE | Refills: 0 | Status: SHIPPED | OUTPATIENT
Start: 2023-11-01 | End: 2023-11-15

## 2023-11-01 RX ORDER — BUPIVACAINE HYDROCHLORIDE 5 MG/ML
INJECTION, SOLUTION PERINEURAL AS NEEDED
Status: DISCONTINUED | OUTPATIENT
Start: 2023-11-01 | End: 2023-11-01 | Stop reason: HOSPADM

## 2023-11-01 RX ORDER — LIDOCAINE HYDROCHLORIDE 10 MG/ML
INJECTION, SOLUTION EPIDURAL; INFILTRATION; INTRACAUDAL; PERINEURAL AS NEEDED
Status: DISCONTINUED | OUTPATIENT
Start: 2023-11-01 | End: 2023-11-01

## 2023-11-01 RX ORDER — ALBUTEROL SULFATE 0.83 MG/ML
2.5 SOLUTION RESPIRATORY (INHALATION) EVERY 30 MIN PRN
Status: DISCONTINUED | OUTPATIENT
Start: 2023-11-01 | End: 2023-11-01 | Stop reason: HOSPADM

## 2023-11-01 RX ORDER — SODIUM CHLORIDE, SODIUM LACTATE, POTASSIUM CHLORIDE, CALCIUM CHLORIDE 600; 310; 30; 20 MG/100ML; MG/100ML; MG/100ML; MG/100ML
40 INJECTION, SOLUTION INTRAVENOUS CONTINUOUS
Status: DISCONTINUED | OUTPATIENT
Start: 2023-11-01 | End: 2023-11-01 | Stop reason: HOSPADM

## 2023-11-01 RX ORDER — HYDROCODONE BITARTRATE AND ACETAMINOPHEN 5; 325 MG/1; MG/1
1 TABLET ORAL EVERY 8 HOURS PRN
Qty: 15 TABLET | Refills: 0 | Status: SHIPPED | OUTPATIENT
Start: 2023-11-01 | End: 2023-11-06

## 2023-11-01 RX ORDER — FENTANYL CITRATE 50 UG/ML
50 INJECTION, SOLUTION INTRAMUSCULAR; INTRAVENOUS EVERY 5 MIN PRN
Status: DISCONTINUED | OUTPATIENT
Start: 2023-11-01 | End: 2023-11-01 | Stop reason: HOSPADM

## 2023-11-01 RX ORDER — LABETALOL HYDROCHLORIDE 5 MG/ML
5 INJECTION, SOLUTION INTRAVENOUS EVERY 5 MIN PRN
Status: DISCONTINUED | OUTPATIENT
Start: 2023-11-01 | End: 2023-11-01 | Stop reason: HOSPADM

## 2023-11-01 RX ORDER — ONDANSETRON HYDROCHLORIDE 2 MG/ML
4 INJECTION, SOLUTION INTRAVENOUS ONCE AS NEEDED
Status: COMPLETED | OUTPATIENT
Start: 2023-11-01 | End: 2023-11-01

## 2023-11-01 RX ORDER — FENTANYL CITRATE 50 UG/ML
INJECTION, SOLUTION INTRAMUSCULAR; INTRAVENOUS AS NEEDED
Status: DISCONTINUED | OUTPATIENT
Start: 2023-11-01 | End: 2023-11-01

## 2023-11-01 RX ORDER — BACITRACIN 500 [USP'U]/G
OINTMENT TOPICAL AS NEEDED
Status: DISCONTINUED | OUTPATIENT
Start: 2023-11-01 | End: 2023-11-01 | Stop reason: HOSPADM

## 2023-11-01 RX ORDER — SODIUM CHLORIDE, SODIUM LACTATE, POTASSIUM CHLORIDE, CALCIUM CHLORIDE 600; 310; 30; 20 MG/100ML; MG/100ML; MG/100ML; MG/100ML
100 INJECTION, SOLUTION INTRAVENOUS CONTINUOUS
Status: DISCONTINUED | OUTPATIENT
Start: 2023-11-01 | End: 2023-11-01 | Stop reason: HOSPADM

## 2023-11-01 RX ORDER — MEPERIDINE HYDROCHLORIDE 25 MG/ML
12.5 INJECTION INTRAMUSCULAR; INTRAVENOUS; SUBCUTANEOUS EVERY 10 MIN PRN
Status: DISCONTINUED | OUTPATIENT
Start: 2023-11-01 | End: 2023-11-01 | Stop reason: HOSPADM

## 2023-11-01 RX ORDER — MIDAZOLAM HYDROCHLORIDE 1 MG/ML
INJECTION, SOLUTION INTRAMUSCULAR; INTRAVENOUS AS NEEDED
Status: DISCONTINUED | OUTPATIENT
Start: 2023-11-01 | End: 2023-11-01

## 2023-11-01 RX ORDER — PHENYLEPHRINE HCL IN 0.9% NACL 1 MG/10 ML
SYRINGE (ML) INTRAVENOUS AS NEEDED
Status: DISCONTINUED | OUTPATIENT
Start: 2023-11-01 | End: 2023-11-01

## 2023-11-01 RX ADMIN — Medication 100 MCG: at 07:48

## 2023-11-01 RX ADMIN — FENTANYL CITRATE 50 MCG: 50 INJECTION INTRAMUSCULAR; INTRAVENOUS at 09:10

## 2023-11-01 RX ADMIN — ONDANSETRON 4 MG: 2 INJECTION, SOLUTION INTRAMUSCULAR; INTRAVENOUS at 07:20

## 2023-11-01 RX ADMIN — CEFAZOLIN SODIUM 2 G: 2 INJECTION, SOLUTION INTRAVENOUS at 07:08

## 2023-11-01 RX ADMIN — LIDOCAINE HYDROCHLORIDE 5 ML: 10 INJECTION, SOLUTION EPIDURAL; INFILTRATION; INTRACAUDAL; PERINEURAL at 07:10

## 2023-11-01 RX ADMIN — SODIUM CHLORIDE, SODIUM LACTATE, POTASSIUM CHLORIDE, AND CALCIUM CHLORIDE 100 ML/HR: 600; 310; 30; 20 INJECTION, SOLUTION INTRAVENOUS at 06:13

## 2023-11-01 RX ADMIN — MIDAZOLAM 2 MG: 1 INJECTION INTRAMUSCULAR; INTRAVENOUS at 07:02

## 2023-11-01 RX ADMIN — FENTANYL CITRATE 50 MCG: 50 INJECTION INTRAMUSCULAR; INTRAVENOUS at 07:08

## 2023-11-01 RX ADMIN — ONDANSETRON 4 MG: 2 INJECTION INTRAMUSCULAR; INTRAVENOUS at 09:21

## 2023-11-01 RX ADMIN — FENTANYL CITRATE 50 MCG: 50 INJECTION INTRAMUSCULAR; INTRAVENOUS at 07:02

## 2023-11-01 RX ADMIN — SODIUM CHLORIDE, SODIUM LACTATE, POTASSIUM CHLORIDE, AND CALCIUM CHLORIDE 40 ML/HR: 600; 310; 30; 20 INJECTION, SOLUTION INTRAVENOUS at 08:51

## 2023-11-01 RX ADMIN — GLYCOPYRROLATE 0.2 MG: 0.2 INJECTION INTRAMUSCULAR; INTRAVENOUS at 07:44

## 2023-11-01 RX ADMIN — Medication 100 MCG: at 08:01

## 2023-11-01 RX ADMIN — PROPOFOL 200 MG: 10 INJECTION, EMULSION INTRAVENOUS at 07:10

## 2023-11-01 SDOH — HEALTH STABILITY: MENTAL HEALTH: CURRENT SMOKER: 0

## 2023-11-01 ASSESSMENT — PAIN - FUNCTIONAL ASSESSMENT
PAIN_FUNCTIONAL_ASSESSMENT: FLACC (FACE, LEGS, ACTIVITY, CRY, CONSOLABILITY)
PAIN_FUNCTIONAL_ASSESSMENT: 0-10
PAIN_FUNCTIONAL_ASSESSMENT: FLACC (FACE, LEGS, ACTIVITY, CRY, CONSOLABILITY)

## 2023-11-01 ASSESSMENT — COLUMBIA-SUICIDE SEVERITY RATING SCALE - C-SSRS
1. IN THE PAST MONTH, HAVE YOU WISHED YOU WERE DEAD OR WISHED YOU COULD GO TO SLEEP AND NOT WAKE UP?: NO
6. HAVE YOU EVER DONE ANYTHING, STARTED TO DO ANYTHING, OR PREPARED TO DO ANYTHING TO END YOUR LIFE?: NO
2. HAVE YOU ACTUALLY HAD ANY THOUGHTS OF KILLING YOURSELF?: NO

## 2023-11-01 ASSESSMENT — PAIN SCALES - GENERAL
PAINLEVEL_OUTOF10: 0 - NO PAIN
PAINLEVEL_OUTOF10: 2
PAINLEVEL_OUTOF10: 2
PAINLEVEL_OUTOF10: 3
PAIN_LEVEL: 0
PAINLEVEL_OUTOF10: 7
PAINLEVEL_OUTOF10: 0 - NO PAIN

## 2023-11-01 ASSESSMENT — PAIN DESCRIPTION - DESCRIPTORS: DESCRIPTORS: DULL

## 2023-11-01 NOTE — ANESTHESIA POSTPROCEDURE EVALUATION
Patient: Shira Castaneda    Procedure Summary       Date: 11/01/23 Room / Location: [facility] / Virtual [facility]    Anesthesia Start: 0703 Anesthesia Stop: 0855    Procedure: LEFT MIDDLE FINGER TRIGGER RELEASE, LEFT RING FINGER FLEXOR TENOSYNOVECTOMY (Left: Fingers) Diagnosis:     Surgeons: Surinder Landin MD Responsible Provider: Hardik Dunn MD    Anesthesia Type: general ASA Status: 1            Anesthesia Type: general    Vitals Value Taken Time   /90 11/01/23 0858   Temp 36 11/01/23 0858   Pulse 76 11/01/23 0858   Resp 12 11/01/23 0858   SpO2 99 11/01/23 0858       Anesthesia Post Evaluation    Patient location during evaluation: bedside  Patient participation: complete - patient participated  Level of consciousness: responsive to verbal stimuli  Pain score: 0  Pain management: adequate  Multimodal analgesia pain management approach  Cardiovascular status: acceptable  Respiratory status: acceptable  Hydration status: acceptable        There were no known notable events for this encounter.

## 2023-11-01 NOTE — ANESTHESIA PREPROCEDURE EVALUATION
Patient: Shira Castaneda    Procedure Information       Date/Time: 11/01/23 0700    Procedure: LEFT MIDDLE FINGER TRIGGER RELEASE, LEFT RING FINGER FLEXOR TENOSYNOVECTOMY (Left: Fingers)    Location: [facility] / [facility]    Surgeons: Surinder Landin MD            Relevant Problems   Cardiovascular   (+) HLD (hyperlipidemia)   (+) HTN (hypertension), benign      Endocrine   (+) Diabetes mellitus type 2 in obese (CMS/HCC)   (+) Diabetes mellitus type 2, uncomplicated (CMS/HCC)   (+) Obesity      Neuro/Psych   (+) Carpal tunnel syndrome, left upper limb   (+) Carpal tunnel syndrome, right upper limb   (+) Cubital tunnel syndrome on left      Musculoskeletal   (+) Carpal tunnel syndrome, left upper limb   (+) Carpal tunnel syndrome, right upper limb   (+) Chronic pain syndrome      Infectious Disease   (+) Methicillin susceptible Staphylococcus aureus infection       Clinical information reviewed:   Tobacco  Allergies  Meds   Med Hx  Surg Hx  OB Status  Fam Hx  Soc   Hx        NPO Detail:  NPO/Void Status  Date of Last Liquid: 10/31/23  Time of Last Liquid: 2000  Date of Last Solid: 10/31/23  Time of Last Solid: 2000  Last Intake Type: Clear fluids; Light meal  Time of Last Void: 0500         Physical Exam    Airway  Mallampati: I  TM distance: >3 FB  Neck ROM: full     Cardiovascular - normal exam     Dental - normal exam     Pulmonary - normal exam     Abdominal            Anesthesia Plan    ASA 1     general   (TIVA)  The patient is not a current smoker.    intravenous induction   Anesthetic plan and risks discussed with patient.    Plan discussed with CRNA and CAA.

## 2023-11-01 NOTE — PERIOPERATIVE NURSING NOTE
Patient in Phase 2; dressed and up to chair with RN assist. Tolerating po fluids, minimal complaint of pain and no complaint of nausea.     Significant other at bedside; discussed discharge instructions with patient and Significant other. All questions at this time answered.     Patient clinically appropriate for discharge. IV removed and patient transported to discharge area via wheelchair.

## 2023-11-01 NOTE — OP NOTE
LEFT MIDDLE FINGER TRIGGER RELEASE, LEFT RING FINGER FLEXOR TENOSYNOVECTOMY (L) Operative Note     Date: 2023  OR Location: [facility]    Name: Shira Castaneda, : 1960, Age: 63 y.o., MRN: [id number], Sex: female    Diagnosis  Left long finger trigger digit with left ring finger contracture with severe tendon adhesions after previous infection Same with severe tearing scarring of the FDS tendon to the ring finger     Procedures    * LEFT MIDDLE FINGER TRIGGER RELEASE, LEFT RING FINGER FLEXOR TENOSYNOVECTOMY  Left ring finger excision scarred fds tendon    Surgeons      * Surinder Landin - Primary    Resident/Fellow/Other Assistant:  Surgeon(s) and Role:  Jada Laws PA-C  Procedure Summary  Anesthesia: Monitor Anesthesia Care  ASA: I  Anesthesia Staff: Anesthesiologist: Hardik Dunn MD  CRNA: JUSTIN Bolton-CRNA  Estimated Blood Loss: 5mL  Intra-op Medications:   Medication Name Total Dose   lidocaine (Xylocaine) 10 mg/mL (1 %) injection 4.5 mL   BUPivacaine HCl (Marcaine) 0.5 % (5 mg/mL) injection 4.5 mL   lactated Ringer's infusion 166.67 mL   ceFAZolin in dextrose (iso-os) (Ancef) IVPB 2 g 2 g              Anesthesia Record               Intraprocedure I/O Totals          Intake    Propofol Drip 0.00 mL    The total shown is the total volume documented since Anesthesia Start was filed.    lactated Ringer's infusion 500.00 mL    ceFAZolin in dextrose (iso-os) (Ancef) IVPB 2 g 100.00 mL    Total Intake 600 mL       Output    Est. Blood Loss 10 mL    Total Output 10 mL       Net    Net Volume 590 mL          Specimen: No specimens collected     Staff:   Circulator: Regina Malhotra RN  Scrub Person: Harsha Fernández; Andrew Orellana         Drains and/or Catheters: * None in log *    Tourniquet Times:     Total Tourniquet Time Documented:  Arm - Upper (Left) - 69 minutes  Total: Arm - Upper (Left) - 69 minutes      Implants:     Findings: As above    Indications: Shira Castaneda is an 63 y.o.  female who is having surgery for * No pre-op diagnosis entered *.  This pleasant patient has had a long history of recovery in the left hand with revision carpal tunnel release complicated by severe infection with repeat surgery and she is work diligently in therapy over the last 10 months and has made gains but not in the ring finger in terms of functional gain of motion.  I had a long discussion preoperatively over the previous months and again today with the patient and her .  Our goal is to gain motion but full motion recovery is unlikely with the severe scarring but our goal is to get a functional range of motion back, but they do understand that there are severe risk involved with this procedure such as infection recurrence scarring and no improvement and even worsening of symptoms, they understand these risk completely and wished to proceed we talked about all the above procedures preoperatively and they do understand that depending on intraoperative findings, additional procedures may be necessary.    The patient was seen in the preoperative area. The risks, benefits, complications, treatment options, non-operative alternatives, expected recovery and outcomes were discussed with the patient. The possibilities of reaction to medication, pulmonary aspiration, injury to surrounding structures, bleeding, recurrent infection, the need for additional procedures, failure to diagnose a condition, and creating a complication requiring transfusion or operation were discussed with the patient. The patient concurred with the proposed plan, giving informed consent.  The site of surgery was properly noted/marked if necessary per policy. The patient has been actively warmed in preoperative area. Preoperative antibiotics have been ordered and given within 1 hours of incision. Venous thrombosis prophylaxis have been ordered including bilateral sequential compression devices    Procedure Details: Yvette patient  brought the operating room and after sterilely prepping draping form a timeout we first opened up the palm and immediately saw a large area of scar tissue of the palmar fascia right onto the FDS tendon and the sheath.  As we went down we saw that this fascial sheath was very adherent to the scarred and severely deteriorated FDS tendon we went down and opened up the sheath of the A1 pulley area and there was some release of the A2 pulley but the A2 and A4 pulley system was intact, at this juncture we did release and did a tenolysis of the FDS and FDP tendons but with the complete release of the FDS there was no pull-through and minimal pull-through to the PIP joint.  The majority of all the motion was from the FDP and with the severe scarring deterioration, to give her the best long-term chance of function, we isolated the insertion of the FDS and released this and carefully excised the FDS tendon proximally.  At this point we were able to perform a full tenolysis of the FDP tendon keeping the pulley system intact.  At this juncture we did a tedious release and we actually used a small gauge wire to release the posterior aspect of the tendon sheath.  This was passed underneath the pulley system and then fed under the tendon and brought back out proximally.  We had good pull-through all the way from the PIP joint to the DIP joint after tedious tenolysis.  At this point we did use the palmar incision spread over to the extremely thickened A1 pulley of the long finger and did a pulley release of the long finger and the underlying tendons look good no tendon bunching or issues with A1 pulley release of the long finger.  At this point we let down the tourniquet the fingers pinked up nicely closure was performed.  Bulky dressing placed we did have the patient actively flex the hand and there was pull-through all the way through the PIP and DIP joint, there were no complications.  She will be followed up in therapy with  close adherence to postoperative protocol and follow-up.  Jada Laws PA-C active surgical assistant during this case and assistance greatly reduced operative time and aided in performance the case  Complications:  None; patient tolerated the procedure well.    Disposition: PACU - hemodynamically stable.  Condition: stable         Additional Details: 0    Attending Attestation:     Surinder Landin  Phone Number: [phone]

## 2023-11-01 NOTE — ANESTHESIA PROCEDURE NOTES
Airway  Date/Time: 11/1/2023 7:02 AM    Staffing  Performed: CRNA   Authorized by: Hardik Dunn MD    Performed by: JUSTIN Bolton-CRNA    Indications and Patient Condition  Indications for airway management: anesthesia  Spontaneous ventilation: present  Sedation level: deep  Preoxygenated: yes  Patient position: sniffing  MILS maintained throughout  Mask difficulty assessment: 0 - not attempted    Final Airway Details  Final airway type: supraglottic airway      Successful airway: Size 4  Airway Seal Pressure (cm H2O): 10

## 2023-11-01 NOTE — PERIOPERATIVE NURSING NOTE
Pt sitting  up. Pain is tolerable. Zofran given for nausea. Pt sitting up and trying crackers and ginger ale.

## 2023-11-02 ASSESSMENT — PAIN SCALES - GENERAL: PAINLEVEL_OUTOF10: 3

## 2023-11-09 ENCOUNTER — APPOINTMENT (OUTPATIENT)
Dept: ORTHOPEDIC SURGERY | Facility: HOSPITAL | Age: 63
End: 2023-11-09
Payer: COMMERCIAL

## 2023-11-15 DIAGNOSIS — M65.331 ACQUIRED TRIGGER FINGER OF RIGHT MIDDLE FINGER: ICD-10-CM

## 2023-11-15 RX ORDER — LOSARTAN POTASSIUM 50 MG/1
50 TABLET ORAL DAILY
Qty: 90 TABLET | Refills: 3 | Status: SHIPPED | OUTPATIENT
Start: 2023-11-15 | End: 2024-05-31 | Stop reason: SDUPTHER

## 2023-11-15 RX ORDER — ATORVASTATIN CALCIUM 20 MG/1
20 TABLET, FILM COATED ORAL DAILY
Qty: 90 TABLET | Refills: 3 | Status: SHIPPED | OUTPATIENT
Start: 2023-11-15 | End: 2024-05-31 | Stop reason: SDUPTHER

## 2023-11-15 RX ORDER — SEMAGLUTIDE 0.68 MG/ML
0.5 INJECTION, SOLUTION SUBCUTANEOUS
Qty: 3 ML | Refills: 11 | Status: SHIPPED | OUTPATIENT
Start: 2023-11-15 | End: 2024-05-31 | Stop reason: WASHOUT

## 2023-11-16 ENCOUNTER — OFFICE VISIT (OUTPATIENT)
Dept: ORTHOPEDIC SURGERY | Facility: HOSPITAL | Age: 63
End: 2023-11-16
Payer: COMMERCIAL

## 2023-11-16 DIAGNOSIS — L90.5 CONTRACTURE OF JOINT OF FINGER OF LEFT HAND DUE TO SCAR: Primary | ICD-10-CM

## 2023-11-16 DIAGNOSIS — M65.332 ACQUIRED TRIGGER FINGER OF LEFT MIDDLE FINGER: ICD-10-CM

## 2023-11-16 DIAGNOSIS — M24.542 CONTRACTURE OF JOINT OF FINGER OF LEFT HAND DUE TO SCAR: Primary | ICD-10-CM

## 2023-11-16 PROCEDURE — 4010F ACE/ARB THERAPY RXD/TAKEN: CPT | Performed by: ORTHOPAEDIC SURGERY

## 2023-11-16 PROCEDURE — 3048F LDL-C <100 MG/DL: CPT | Performed by: ORTHOPAEDIC SURGERY

## 2023-11-16 PROCEDURE — 99024 POSTOP FOLLOW-UP VISIT: CPT | Performed by: ORTHOPAEDIC SURGERY

## 2023-11-16 PROCEDURE — 3044F HG A1C LEVEL LT 7.0%: CPT | Performed by: ORTHOPAEDIC SURGERY

## 2023-11-16 PROCEDURE — 1036F TOBACCO NON-USER: CPT | Performed by: ORTHOPAEDIC SURGERY

## 2023-11-16 ASSESSMENT — PAIN - FUNCTIONAL ASSESSMENT: PAIN_FUNCTIONAL_ASSESSMENT: 0-10

## 2023-11-16 ASSESSMENT — PAIN SCALES - GENERAL: PAINLEVEL_OUTOF10: 3

## 2023-11-16 NOTE — PROGRESS NOTES
Reason for Appointment  Chief Complaint   Patient presents with    Left Hand - Post-op     History of Present Illness  Patient is here today 2 weeks s/p left long and ring finger tenosynovectomies with FDS excision on 11/1/23. She is doing very well, she has been working in therapy on regaining motion and she already has significantly improved flexion in the ring finger. Wound is healing nicely with no signs of infection, sutures were removed today. With her severe infection history she will be careful with wound care and call us with any changes immediately. 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

## 2023-12-12 ENCOUNTER — LAB (OUTPATIENT)
Dept: LAB | Facility: LAB | Age: 63
End: 2023-12-12
Payer: COMMERCIAL

## 2023-12-12 DIAGNOSIS — E11.9 TYPE 2 DIABETES MELLITUS WITHOUT COMPLICATION, UNSPECIFIED WHETHER LONG TERM INSULIN USE (MULTI): ICD-10-CM

## 2023-12-12 LAB
ALBUMIN SERPL BCP-MCNC: 4.8 G/DL (ref 3.4–5)
ALP SERPL-CCNC: 76 U/L (ref 33–136)
ALT SERPL W P-5'-P-CCNC: 28 U/L (ref 7–45)
ANION GAP SERPL CALC-SCNC: 15 MMOL/L (ref 10–20)
AST SERPL W P-5'-P-CCNC: 25 U/L (ref 9–39)
BILIRUB SERPL-MCNC: 1.2 MG/DL (ref 0–1.2)
BUN SERPL-MCNC: 21 MG/DL (ref 6–23)
CALCIUM SERPL-MCNC: 10.1 MG/DL (ref 8.6–10.6)
CHLORIDE SERPL-SCNC: 105 MMOL/L (ref 98–107)
CHOLEST SERPL-MCNC: 185 MG/DL (ref 0–199)
CHOLESTEROL/HDL RATIO: 2.9
CO2 SERPL-SCNC: 26 MMOL/L (ref 21–32)
CREAT SERPL-MCNC: 0.67 MG/DL (ref 0.5–1.05)
CREAT UR-MCNC: 113.3 MG/DL (ref 20–320)
EST. AVERAGE GLUCOSE BLD GHB EST-MCNC: 105 MG/DL
GFR SERPL CREATININE-BSD FRML MDRD: >90 ML/MIN/1.73M*2
GLUCOSE SERPL-MCNC: 116 MG/DL (ref 74–99)
HBA1C MFR BLD: 5.3 %
HDLC SERPL-MCNC: 62.8 MG/DL
LDLC SERPL CALC-MCNC: 92 MG/DL
MICROALBUMIN UR-MCNC: 8 MG/L
MICROALBUMIN/CREAT UR: 7.1 UG/MG CREAT
NON HDL CHOLESTEROL: 122 MG/DL (ref 0–149)
POTASSIUM SERPL-SCNC: 4.6 MMOL/L (ref 3.5–5.3)
PROT SERPL-MCNC: 7.8 G/DL (ref 6.4–8.2)
SODIUM SERPL-SCNC: 141 MMOL/L (ref 136–145)
TRIGL SERPL-MCNC: 149 MG/DL (ref 0–149)
TSH SERPL-ACNC: 2.94 MIU/L (ref 0.44–3.98)
VLDL: 30 MG/DL (ref 0–40)

## 2023-12-12 PROCEDURE — 82570 ASSAY OF URINE CREATININE: CPT

## 2023-12-12 PROCEDURE — 84443 ASSAY THYROID STIM HORMONE: CPT

## 2023-12-12 PROCEDURE — 80061 LIPID PANEL: CPT

## 2023-12-12 PROCEDURE — 83036 HEMOGLOBIN GLYCOSYLATED A1C: CPT

## 2023-12-12 PROCEDURE — 36415 COLL VENOUS BLD VENIPUNCTURE: CPT

## 2023-12-12 PROCEDURE — 80053 COMPREHEN METABOLIC PANEL: CPT

## 2023-12-12 PROCEDURE — 82043 UR ALBUMIN QUANTITATIVE: CPT

## 2023-12-13 ENCOUNTER — CLINICAL SUPPORT (OUTPATIENT)
Dept: ENDOCRINOLOGY | Facility: CLINIC | Age: 63
End: 2023-12-13
Payer: COMMERCIAL

## 2023-12-13 DIAGNOSIS — E11.9 TYPE 2 DIABETES MELLITUS WITHOUT COMPLICATION, WITHOUT LONG-TERM CURRENT USE OF INSULIN (MULTI): Primary | ICD-10-CM

## 2023-12-13 PROCEDURE — 99211 OFF/OP EST MAY X REQ PHY/QHP: CPT | Performed by: PHARMACIST

## 2023-12-13 PROCEDURE — 95251 CONT GLUC MNTR ANALYSIS I&R: CPT | Performed by: PHARMACIST

## 2023-12-14 ENCOUNTER — OFFICE VISIT (OUTPATIENT)
Dept: ORTHOPEDIC SURGERY | Facility: HOSPITAL | Age: 63
End: 2023-12-14
Payer: COMMERCIAL

## 2023-12-14 DIAGNOSIS — M65.332 ACQUIRED TRIGGER FINGER OF LEFT MIDDLE FINGER: ICD-10-CM

## 2023-12-14 DIAGNOSIS — L90.5 CONTRACTURE OF JOINT OF FINGER OF LEFT HAND DUE TO SCAR: Primary | ICD-10-CM

## 2023-12-14 DIAGNOSIS — M24.542 CONTRACTURE OF JOINT OF FINGER OF LEFT HAND DUE TO SCAR: Primary | ICD-10-CM

## 2023-12-14 PROCEDURE — 3044F HG A1C LEVEL LT 7.0%: CPT | Performed by: ORTHOPAEDIC SURGERY

## 2023-12-14 PROCEDURE — 99024 POSTOP FOLLOW-UP VISIT: CPT | Performed by: ORTHOPAEDIC SURGERY

## 2023-12-14 PROCEDURE — 3061F NEG MICROALBUMINURIA REV: CPT | Performed by: ORTHOPAEDIC SURGERY

## 2023-12-14 PROCEDURE — 4010F ACE/ARB THERAPY RXD/TAKEN: CPT | Performed by: ORTHOPAEDIC SURGERY

## 2023-12-14 PROCEDURE — 3048F LDL-C <100 MG/DL: CPT | Performed by: ORTHOPAEDIC SURGERY

## 2023-12-14 PROCEDURE — 1036F TOBACCO NON-USER: CPT | Performed by: ORTHOPAEDIC SURGERY

## 2023-12-14 ASSESSMENT — PAIN SCALES - GENERAL: PAINLEVEL_OUTOF10: 2

## 2023-12-14 ASSESSMENT — PAIN - FUNCTIONAL ASSESSMENT: PAIN_FUNCTIONAL_ASSESSMENT: 0-10

## 2023-12-14 NOTE — PROGRESS NOTES
Clinical Pharmacy Team contacted met with Shira Castaneda regarding a consultation for diabetes management thanks to a referral from Dr. Sheldon Perez MD. Below is a summary of our conversation and recommendations:    Recommendations:  1.Continue all DM medications as prescribed  2. Patient should continue to use CGM to monitor glucose  ________________________________________________________________________      Allergies   Allergen Reactions    Dapagliflozin Hives    Dapagliflozin Propanediol Hives    Metformin Other     numbness on side of face    Pioglitazone Hives    Rosuvastatin Rash     Numbness in face         Diabetes Pharmacotherapy:    Ozempic 0.5 mg subcutaneous once weekly    Lab Review  Lab Results   Component Value Date    BILITOT 1.2 12/12/2023    CALCIUM 10.1 12/12/2023    CO2 26 12/12/2023     12/12/2023    CREATININE 0.67 12/12/2023    GLUCOSE 116 (H) 12/12/2023    ALKPHOS 76 12/12/2023    K 4.6 12/12/2023    PROT 7.8 12/12/2023     12/12/2023    AST 25 12/12/2023    ALT 28 12/12/2023    BUN 21 12/12/2023    ANIONGAP 15 12/12/2023    MG 1.9 12/06/2022    PHOS 2.5 12/06/2022    ALBUMIN 4.8 12/12/2023    GFRF >90 06/19/2023     Lab Results   Component Value Date    TRIG 149 12/12/2023    CHOL 185 12/12/2023    LDLCALC 92 12/12/2023    HDL 62.8 12/12/2023     Lab Results   Component Value Date    HGBA1C 5.3 12/12/2023    HGBA1C 5.5 10/17/2023    HGBA1C 8.5 (A) 06/19/2023     The 10-year ASCVD risk score (Evan DENTON, et al., 2019) is: 8.8%    Values used to calculate the score:      Age: 63 years      Sex: Female      Is Non- : No      Diabetic: Yes      Tobacco smoker: No      Systolic Blood Pressure: 126 mmHg      Is BP treated: Yes      HDL Cholesterol: 62.8 mg/dL      Total Cholesterol: 185 mg/dL      Monitoring         Assessment/Plan     The patient reports today for a diabetes consultation. Reviewed CGM report and discussed it with the patient. TIR is at  goal and doing excellent. Patient reports tolerating her DM regimen well and feeling well overall. Reviewed recent labs. A1c, urine albumin, and most recent BP of 126/75 as of 23 is all at goal. LDL is slightly elevated but patient is on moderate intensity statin and has intermediate ASCVD risk. Past LDL readings were often at goal. We can continue to monitor and if elevated on repeat can consider a dose increase in statin therapy then. At this time recommend no changes to DM regimen.     Recommend getting repeat labs to be drawn before next endocrinology appointment.    PATIENT EDUCATION/GOALS  Current A1c: [ A1c % ] [ insert date taken ]     Goals  Fasting B - 130 mg/dL  Postprandial BG: less than 180 mg/dL  A1c: less than 7%    Type of encounter: [ in person]    Provided counseling on lifestyle modifications, medications, and self-monitoring. Patient has no additional questions at this time. Pharmacy will not follow up as she is meeting glycemic goals. Patient to follow up with endocrinology provider. Please reach out with any questions. Thank you.       Fernando Chatman, PharmD    Provider on site: Celine Giraldo CNP  Continue all meds under the continuation of care with the referring provider and clinical pharmacy team.

## 2023-12-14 NOTE — PROGRESS NOTES
Reason for Appointment  Chief Complaint   Patient presents with    Left Hand - Post-op     History of Present Illness  Patient is here today 6 weeks s/p left long and ring finger tenosynovectomies with FDS excision 11/1/23. Patient is doing well overall. Incision looks excellent. She is gaining more and more flexion and made significant progress in tendon glides. She only lacks about 30 degrees of flexion compared to the small finger. She is working with her hand therapist. She understands wound care and postoperative protocol. Follow-up in 2 to 3 months.     Assessment   Encounter Diagnoses   Name Primary?    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 By signing below, I, Surnider Landin MD, personally performed the services described in this documentation. All medical record entries made by the scribe were at my direction and in my presence. I have reviewed the chart and agree that the record reflects my personal performance and is accurate and complete. 12/14/23

## 2024-03-14 ENCOUNTER — OFFICE VISIT (OUTPATIENT)
Dept: ORTHOPEDIC SURGERY | Facility: HOSPITAL | Age: 64
End: 2024-03-14
Payer: COMMERCIAL

## 2024-03-14 DIAGNOSIS — M24.542 CONTRACTURE OF JOINT OF FINGER OF LEFT HAND DUE TO SCAR: Primary | ICD-10-CM

## 2024-03-14 DIAGNOSIS — M65.332 ACQUIRED TRIGGER FINGER OF LEFT MIDDLE FINGER: ICD-10-CM

## 2024-03-14 DIAGNOSIS — L90.5 CONTRACTURE OF JOINT OF FINGER OF LEFT HAND DUE TO SCAR: Primary | ICD-10-CM

## 2024-03-14 PROCEDURE — 1036F TOBACCO NON-USER: CPT | Performed by: ORTHOPAEDIC SURGERY

## 2024-03-14 PROCEDURE — 4010F ACE/ARB THERAPY RXD/TAKEN: CPT | Performed by: ORTHOPAEDIC SURGERY

## 2024-03-14 PROCEDURE — 99213 OFFICE O/P EST LOW 20 MIN: CPT | Performed by: ORTHOPAEDIC SURGERY

## 2024-03-14 ASSESSMENT — PAIN SCALES - GENERAL: PAINLEVEL_OUTOF10: 2

## 2024-03-14 ASSESSMENT — ENCOUNTER SYMPTOMS
WHEEZING: 0
ARTHRALGIAS: 1
SHORTNESS OF BREATH: 0
FEVER: 0
CHILLS: 0
FATIGUE: 0

## 2024-03-14 ASSESSMENT — PAIN - FUNCTIONAL ASSESSMENT: PAIN_FUNCTIONAL_ASSESSMENT: 0-10

## 2024-03-14 NOTE — PROGRESS NOTES
Reason for Appointment  Chief Complaint   Patient presents with    Left Hand - Follow-up     History of Present Illness  Patient is a 63 y.o. female here today for follow-up evaluation of her left hand 4.5 months s/p left long and ring finger tenosynovectomies with FDS excision 11/1/23. She has continued to make slow improvement. She did have an initial large gain in flexion that has somewhat stabilized. She is doing extension splinting and continues in therapy once per week which can continue as long as she sees benefit. No other updates to PMH, allergies, or medications.     Past Medical History:   Diagnosis Date    Essential (primary) hypertension 01/05/2023    HTN (hypertension), benign    Hyperlipidemia, unspecified 01/07/2023    HLD (hyperlipidemia)    Type 2 diabetes mellitus without complications (CMS/[facility]) 01/05/2023    Diabetes mellitus type 2, uncomplicated       Past Surgical History:   Procedure Laterality Date    CARPAL TUNNEL RELEASE Left     CHOLECYSTECTOMY      COLONOSCOPY      OTHER SURGICAL HISTORY  10/19/2020    Cholecystectomy    OTHER SURGICAL HISTORY  01/13/2021    Abdominoplasty    OTHER SURGICAL HISTORY  01/13/2021    Tubal ligation    TUBAL LIGATION         Medication Documentation Review Audit       Reviewed by Nova Leone MA (Medical Assistant) on 03/14/24 at 1305      Medication Order Taking? Sig Documenting Provider Last Dose Status   atorvastatin (Lipitor) 20 mg tablet [id number] Yes Take 1 tablet (20 mg) by mouth once daily. Sheldon Perez MD Taking Active   FREESTYLE LANCETS MISC [id number] Yes Test blood sugars twice daily as directed Historical Provider, MD Taking Active   FreeStyle Lite Strips strip [id number] Yes twice a day. Historical Provider, MD Taking Active   losartan (Cozaar) 50 mg tablet [id number] Yes Take 1 tablet (50 mg) by mouth once daily. Sheldon Perez MD Taking Active   multivitamin tablet [id number] Yes Take 1 tablet by mouth once daily. Historical  Provider, MD Taking Active   semaglutide (Ozempic) 0.25 mg or 0.5 mg (2 mg/3 mL) pen injector [id number] Yes Inject 0.5 mg under the skin 1 (one) time per week. Sheldon Perez MD Taking Active                    Allergies   Allergen Reactions    Dapagliflozin Hives    Dapagliflozin Propanediol Hives    Metformin Other     numbness on side of face    Pioglitazone Hives    Rosuvastatin Rash     Numbness in face       Review of Systems   Constitutional:  Negative for chills, fatigue and fever.   Respiratory:  Negative for shortness of breath and wheezing.    Cardiovascular:  Negative for chest pain and leg swelling.   Musculoskeletal:  Positive for arthralgias.   All other systems reviewed and are negative.    Exam   On exam of the left hand, there is a 10 to 15 degree flexion contracture of left ring PIP with small DIP contracture of about 30 degrees, composite flexion has improved to about 20 to 25 degree lack compared to the small finger, hypersensitivity is better, no significant thenar atrophy, no signs of infection or any incisional problems.     Assessment   Encounter Diagnoses   Name Primary?    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 Follow-up in 6 months.     Jigna FERGUSON, attest that this documentation has been prepared under the direction and in the presence of Surinder Landin MD. By signing below, Surinder FERGUSON MD, personally performed the services described in this documentation. All medical record entries made by the scribe were at my direction and in my presence. I have reviewed the chart and agree that the record reflects my personal performance and is accurate and complete. 03/14/24

## 2024-05-30 ENCOUNTER — LAB (OUTPATIENT)
Dept: LAB | Facility: LAB | Age: 64
End: 2024-05-30
Payer: COMMERCIAL

## 2024-05-30 DIAGNOSIS — E11.9 TYPE 2 DIABETES MELLITUS WITHOUT COMPLICATION, WITHOUT LONG-TERM CURRENT USE OF INSULIN (MULTI): ICD-10-CM

## 2024-05-30 LAB
ALBUMIN SERPL BCP-MCNC: 4.5 G/DL (ref 3.4–5)
ALP SERPL-CCNC: 67 U/L (ref 33–136)
ALT SERPL W P-5'-P-CCNC: 23 U/L (ref 7–45)
ANION GAP SERPL CALC-SCNC: 13 MMOL/L (ref 10–20)
AST SERPL W P-5'-P-CCNC: 22 U/L (ref 9–39)
BILIRUB SERPL-MCNC: 1.4 MG/DL (ref 0–1.2)
BUN SERPL-MCNC: 16 MG/DL (ref 6–23)
CALCIUM SERPL-MCNC: 10.2 MG/DL (ref 8.6–10.6)
CHLORIDE SERPL-SCNC: 103 MMOL/L (ref 98–107)
CHOLEST SERPL-MCNC: 176 MG/DL (ref 0–199)
CHOLESTEROL/HDL RATIO: 2.7
CO2 SERPL-SCNC: 27 MMOL/L (ref 21–32)
CREAT SERPL-MCNC: 0.76 MG/DL (ref 0.5–1.05)
CREAT UR-MCNC: 99.3 MG/DL (ref 20–320)
EGFRCR SERPLBLD CKD-EPI 2021: 89 ML/MIN/1.73M*2
EST. AVERAGE GLUCOSE BLD GHB EST-MCNC: 117 MG/DL
GLUCOSE SERPL-MCNC: 112 MG/DL (ref 74–99)
HBA1C MFR BLD: 5.7 %
HDLC SERPL-MCNC: 64.6 MG/DL
LDLC SERPL CALC-MCNC: 85 MG/DL
MICROALBUMIN UR-MCNC: 10 MG/L
MICROALBUMIN/CREAT UR: 10.1 UG/MG CREAT
NON HDL CHOLESTEROL: 111 MG/DL (ref 0–149)
POTASSIUM SERPL-SCNC: 4.3 MMOL/L (ref 3.5–5.3)
PROT SERPL-MCNC: 7.4 G/DL (ref 6.4–8.2)
SODIUM SERPL-SCNC: 139 MMOL/L (ref 136–145)
TRIGL SERPL-MCNC: 131 MG/DL (ref 0–149)
VLDL: 26 MG/DL (ref 0–40)

## 2024-05-30 PROCEDURE — 80061 LIPID PANEL: CPT

## 2024-05-30 PROCEDURE — 36415 COLL VENOUS BLD VENIPUNCTURE: CPT

## 2024-05-30 PROCEDURE — 80053 COMPREHEN METABOLIC PANEL: CPT

## 2024-05-30 PROCEDURE — 82043 UR ALBUMIN QUANTITATIVE: CPT

## 2024-05-30 PROCEDURE — 83036 HEMOGLOBIN GLYCOSYLATED A1C: CPT

## 2024-05-30 PROCEDURE — 82570 ASSAY OF URINE CREATININE: CPT

## 2024-05-31 ENCOUNTER — TELEMEDICINE (OUTPATIENT)
Dept: ENDOCRINOLOGY | Facility: CLINIC | Age: 64
End: 2024-05-31
Payer: COMMERCIAL

## 2024-05-31 DIAGNOSIS — E11.9 TYPE 2 DIABETES MELLITUS WITHOUT COMPLICATION, WITHOUT LONG-TERM CURRENT USE OF INSULIN (MULTI): Primary | ICD-10-CM

## 2024-05-31 DIAGNOSIS — M65.331 ACQUIRED TRIGGER FINGER OF RIGHT MIDDLE FINGER: ICD-10-CM

## 2024-05-31 PROCEDURE — 3048F LDL-C <100 MG/DL: CPT | Performed by: INTERNAL MEDICINE

## 2024-05-31 PROCEDURE — 99214 OFFICE O/P EST MOD 30 MIN: CPT | Performed by: INTERNAL MEDICINE

## 2024-05-31 PROCEDURE — 4010F ACE/ARB THERAPY RXD/TAKEN: CPT | Performed by: INTERNAL MEDICINE

## 2024-05-31 PROCEDURE — 95251 CONT GLUC MNTR ANALYSIS I&R: CPT | Performed by: INTERNAL MEDICINE

## 2024-05-31 PROCEDURE — 3061F NEG MICROALBUMINURIA REV: CPT | Performed by: INTERNAL MEDICINE

## 2024-05-31 PROCEDURE — 3044F HG A1C LEVEL LT 7.0%: CPT | Performed by: INTERNAL MEDICINE

## 2024-05-31 RX ORDER — BLOOD-GLUCOSE SENSOR
EACH MISCELLANEOUS
Qty: 2 EACH | Refills: 11 | Status: SHIPPED | OUTPATIENT
Start: 2024-05-31

## 2024-05-31 RX ORDER — LOSARTAN POTASSIUM 50 MG/1
50 TABLET ORAL DAILY
Qty: 90 TABLET | Refills: 3 | Status: SHIPPED | OUTPATIENT
Start: 2024-05-31

## 2024-05-31 RX ORDER — SEMAGLUTIDE 1.34 MG/ML
1 INJECTION, SOLUTION SUBCUTANEOUS
Qty: 9 ML | Refills: 3 | Status: SHIPPED | OUTPATIENT
Start: 2024-06-02

## 2024-05-31 RX ORDER — ATORVASTATIN CALCIUM 20 MG/1
20 TABLET, FILM COATED ORAL DAILY
Qty: 90 TABLET | Refills: 3 | Status: SHIPPED | OUTPATIENT
Start: 2024-05-31

## 2024-05-31 NOTE — PROGRESS NOTES
Consults    Reason For Consult  Diabetes     History Of Present Illness  Shira Castaneda is a 63 y.o. female presenting with diabetes mellitus type 2 .      due ophthalmology 6/2024     she had an eventful surgery , and her recovery was challenging   She had two more surgeries since last visit\        she had hand surgery and multiple infection since September 2022   during all this time his BG has been between 120-160 BG   BG challenging, in some times      multipe sx since due to repeated infections , originally with different bacterias. required even IV antb therapy at home as well ,   Bgare much improved with her hard work                                                   this was from she has an incidental 4, injured to her arm  She did not break any bones   she did see ophthalmology without any known retinopathy in june 2023   She was able to be seen by her primary care physician received Covid booster received flu vaccine  She also did her mammogram as well as all her blood test in the system reviewed,      she is tolerating ozempic      She is eating healthy             her history includes                                                   Type 2 diabetes for many years , she has had reaction to many different drugs, she could not                                          tolerated Actos. , she could not tolerate metformin                                                Hyperlipidemia. 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 Solved, need to look at History of Diabetes Problem List. Includes resolved entries. Clinician can look above and enter the count.        NOTE: Anything under this line is for testing purposes only       Any family history of thyroid cancer? no  Any personal history of radiation to your head/neck? no    Past Medical History  She has a past medical history of Essential (primary) hypertension (01/05/2023), Hyperlipidemia, unspecified (01/07/2023), and Type 2 diabetes mellitus without complications (Multi) (01/05/2023).    Surgical History  She has a past surgical history that includes Other surgical history (10/19/2020); Other surgical history (01/13/2021); Other surgical history (01/13/2021); Tubal ligation; Cholecystectomy; Colonoscopy; and Carpal tunnel release (Left).     Social History  She reports that she has never smoked. She has never used smokeless tobacco. She reports that she does not drink alcohol and does not use drugs.    Family History  Family History   Problem Relation Name Age of Onset    Diabetes Mother      Cancer Mother      Cancer Sister      Diabetes Brother          Allergies  Dapagliflozin, Dapagliflozin propanediol, Metformin, Pioglitazone, and Rosuvastatin    Review of Systems    Past Medical History:   Diagnosis Date    Essential (primary) hypertension 01/05/2023    HTN (hypertension), benign    Hyperlipidemia, unspecified 01/07/2023    HLD (hyperlipidemia)    Type 2 diabetes mellitus without complications (Multi) 01/05/2023    Diabetes mellitus type 2, uncomplicated       Past Surgical History:   Procedure Laterality Date    CARPAL TUNNEL RELEASE Left     CHOLECYSTECTOMY      COLONOSCOPY      OTHER  SURGICAL HISTORY  10/19/2020    Cholecystectomy    OTHER SURGICAL HISTORY  01/13/2021    Abdominoplasty    OTHER SURGICAL HISTORY  01/13/2021    Tubal ligation    TUBAL LIGATION         Social History     Socioeconomic History    Marital status:      Spouse name: Not on file    Number of children: Not on file    Years of education: Not on file    Highest education level: Not on file   Occupational History    Not on file   Tobacco Use    Smoking status: Never    Smokeless tobacco: Never   Substance and Sexual Activity    Alcohol use: Never    Drug use: Never    Sexual activity: Defer   Other Topics Concern    Not on file   Social History Narrative    Not on file     Social Determinants of Health     Financial Resource Strain: Not on file   Food Insecurity: Not on file   Transportation Needs: Not on file   Physical Activity: Not on file   Stress: Not on file   Social Connections: Not on file   Intimate Partner Violence: Not on file   Housing Stability: Not on file        Physical Exam     ROS, PMH, FH/SH, surgical history and allergies have been reviewed.    Last Recorded Vitals  There were no vitals taken for this visit.    Relevant Results  Results from last 7 days   Lab Units 05/30/24  1105   GLUCOSE mg/dL 112*        If you would like to pull in Medications, type .meds     If you would like to pull in Lab results for the last 24 hours, type .[id number]    If you would like to pull in specific Lab results, type .ll     If you would like to pull in Imaging results, type .imgrslt :99}  Lab Review  Lab Results   Component Value Date    BILITOT 1.4 (H) 05/30/2024    CALCIUM 10.2 05/30/2024    CO2 27 05/30/2024     05/30/2024    CREATININE 0.76 05/30/2024    GLUCOSE 112 (H) 05/30/2024    ALKPHOS 67 05/30/2024    K 4.3 05/30/2024    PROT 7.4 05/30/2024     05/30/2024    AST 22 05/30/2024    ALT 23 05/30/2024    BUN 16 05/30/2024    ANIONGAP 13 05/30/2024    MG 1.9 12/06/2022    PHOS 2.5 12/06/2022     ALBUMIN 4.5 05/30/2024    GFRF >90 06/19/2023     Lab Results   Component Value Date    TRIG 131 05/30/2024    CHOL 176 05/30/2024    LDLCALC 85 05/30/2024    HDL 64.6 05/30/2024     Lab Results   Component Value Date    HGBA1C 5.7 (H) 05/30/2024    HGBA1C 5.3 12/12/2023    HGBA1C 5.5 10/17/2023     The 10-year ASCVD risk score (Evan DENTON, et al., 2019) is: 8.4%    Values used to calculate the score:      Age: 63 years      Sex: Female      Is Non- : No      Diabetic: Yes      Tobacco smoker: No      Systolic Blood Pressure: 126 mmHg      Is BP treated: Yes      HDL Cholesterol: 64.6 mg/dL      Total Cholesterol: 176 mg/dL       Assessment/Plan   Problem List Items Addressed This Visit             ICD-10-CM    Diabetes mellitus type 2, uncomplicated (Multi) - Primary E11.9    Relevant Medications    semaglutide (Ozempic) 1 mg/dose (4 mg/3 mL) pen injector (Start on 6/2/2024)    atorvastatin (Lipitor) 20 mg tablet    losartan (Cozaar) 50 mg tablet    blood-glucose sensor (FreeStyle Dany 3 Sensor) device     Other Visit Diagnoses         Codes    Acquired trigger finger of right middle finger     M65.331    Relevant Medications    atorvastatin (Lipitor) 20 mg tablet    losartan (Cozaar) 50 mg tablet    blood-glucose sensor (FreeStyle Dany 3 Sensor) device                 Assessed    · HTN (hypertension), benign (401.1) (I10)   · HLD (hyperlipidemia) (272.4) (E78.5)   · Diabetes mellitus type 2, uncomplicated (250.00) (E11.9)                                                   (E11.9) Type 2 diabetes mellitus without complication,   uncontrolled ,      We tried mounjaro, but the insurance did not approve      On her ozempic 0.5 mg weekly, we will increaes to 1 mg   Her cgm reviewed      She is up-to-date with her eye exam which did not show any retinopathy, she has seen Ortho and foot exam was done as a part during her evaluation  Hyperlipidemia currently controlled is acceptable from the review  of her labs                                                                                                        (E78.5) Hyperlipidemia, unspecified hyperlipidemia type                                         Comment:                                         , lipitor 20 mg .. She is tolerating                                          well so far                                                                                                                                         Her screenings are up-to-date                                              , Blood pressure controlled                                                                                        2. Essential hypertension, benign                                                                                       - losartan (COZAAR) 50 mg tablet; Take 1 tablet by mouth once daily. For                                            blood pressure. Dispense: 90 tablet; Refill: 3                                                                                               refills provided     I spent a total of 30+ minutes on the date of the service which included preparing to see the patient, face-to-face patient care, completing clinical documentation, obtaining and / or reviewing separately obtained history, counseling and educating the patient/family/caregiver, ordering medications, tests, or procedures, communicating with other healthcare providers (not separately reported), independently interpreting results, not separately reported, and communicating results to the patient/family/caregiver, real-time telemedicine visit using audiovisual technology with patient's verbal consent.  Name and date of birth verified.        Sheldon Perez MD

## 2024-06-04 ENCOUNTER — APPOINTMENT (OUTPATIENT)
Dept: ENDOCRINOLOGY | Facility: CLINIC | Age: 64
End: 2024-06-04
Payer: COMMERCIAL

## 2024-06-11 ENCOUNTER — TELEPHONE (OUTPATIENT)
Dept: ENDOCRINOLOGY | Facility: CLINIC | Age: 64
End: 2024-06-11
Payer: COMMERCIAL

## 2024-06-14 DIAGNOSIS — E11.9 TYPE 2 DIABETES MELLITUS WITHOUT COMPLICATION, WITHOUT LONG-TERM CURRENT USE OF INSULIN (MULTI): ICD-10-CM

## 2024-06-14 DIAGNOSIS — M65.331 ACQUIRED TRIGGER FINGER OF RIGHT MIDDLE FINGER: ICD-10-CM

## 2024-06-14 RX ORDER — BLOOD-GLUCOSE SENSOR
EACH MISCELLANEOUS
Refills: 0 | OUTPATIENT
Start: 2024-06-14

## 2024-07-03 ENCOUNTER — TELEPHONE (OUTPATIENT)
Dept: ENDOCRINOLOGY | Facility: CLINIC | Age: 64
End: 2024-07-03
Payer: COMMERCIAL

## 2024-07-03 NOTE — TELEPHONE ENCOUNTER
Prior authorization submitted on behalf of Shira Castaneda to [facility] by [facility] for medication Ozempic. Message from plan:Available without authorization.. Gina Spears LPN

## 2024-07-10 ENCOUNTER — TELEPHONE (OUTPATIENT)
Dept: OBSTETRICS AND GYNECOLOGY | Facility: CLINIC | Age: 64
End: 2024-07-10
Payer: COMMERCIAL

## 2024-07-10 DIAGNOSIS — Z12.31 ENCOUNTER FOR SCREENING MAMMOGRAM FOR BREAST CANCER: Primary | ICD-10-CM

## 2024-08-29 ENCOUNTER — OFFICE VISIT (OUTPATIENT)
Dept: ORTHOPEDIC SURGERY | Facility: HOSPITAL | Age: 64
End: 2024-08-29
Payer: COMMERCIAL

## 2024-08-29 DIAGNOSIS — M65.331 TRIGGER FINGER, RIGHT MIDDLE FINGER: ICD-10-CM

## 2024-08-29 DIAGNOSIS — M24.542 CONTRACTURE OF JOINT OF FINGER OF LEFT HAND DUE TO SCAR: Primary | ICD-10-CM

## 2024-08-29 DIAGNOSIS — L90.5 CONTRACTURE OF JOINT OF FINGER OF LEFT HAND DUE TO SCAR: Primary | ICD-10-CM

## 2024-08-29 PROCEDURE — 99213 OFFICE O/P EST LOW 20 MIN: CPT | Performed by: ORTHOPAEDIC SURGERY

## 2024-08-29 PROCEDURE — 3048F LDL-C <100 MG/DL: CPT | Performed by: ORTHOPAEDIC SURGERY

## 2024-08-29 PROCEDURE — 1036F TOBACCO NON-USER: CPT | Performed by: ORTHOPAEDIC SURGERY

## 2024-08-29 PROCEDURE — 3061F NEG MICROALBUMINURIA REV: CPT | Performed by: ORTHOPAEDIC SURGERY

## 2024-08-29 PROCEDURE — 3044F HG A1C LEVEL LT 7.0%: CPT | Performed by: ORTHOPAEDIC SURGERY

## 2024-08-29 PROCEDURE — 4010F ACE/ARB THERAPY RXD/TAKEN: CPT | Performed by: ORTHOPAEDIC SURGERY

## 2024-08-29 ASSESSMENT — PAIN SCALES - GENERAL: PAINLEVEL_OUTOF10: 1

## 2024-08-29 ASSESSMENT — ENCOUNTER SYMPTOMS
FEVER: 0
SHORTNESS OF BREATH: 0
WHEEZING: 0
JOINT SWELLING: 1
EYE DISCHARGE: 0
ARTHRALGIAS: 1
TROUBLE SWALLOWING: 0
CHILLS: 0
SINUS PAIN: 0
WOUND: 0

## 2024-08-29 ASSESSMENT — PAIN - FUNCTIONAL ASSESSMENT: PAIN_FUNCTIONAL_ASSESSMENT: 0-10

## 2024-08-29 NOTE — PROGRESS NOTES
Reason for Appointment  Chief Complaint   Patient presents with    Left Hand - Follow-up     History of Present Illness  Patient is a 64 y.o. female here today for follow-up evaluation 10 months status post a left long and ring finger tenosynovectomy's with FDS excision.  She has continued to see improvement in terms of motion, she still has about a 30 to 40 degree flexion contracture at the left ring finger PIP joint, she has discussed dynamic splinting with occupational hand therapist in the past.  She has continued home exercises but has plateaued in her progress.  She does have a mild right long trigger finger as well, the finger has been locking on her and catching at times but nothing severe, with her significant infection history we are not in a rush for any surgical intervention.  No other changes in her past medical history, allergies, or medications.    Past Medical History:   Diagnosis Date    Essential (primary) hypertension 01/05/2023    HTN (hypertension), benign    Hyperlipidemia, unspecified 01/07/2023    HLD (hyperlipidemia)    Type 2 diabetes mellitus without complications (Multi) 01/05/2023    Diabetes mellitus type 2, uncomplicated       Past Surgical History:   Procedure Laterality Date    CARPAL TUNNEL RELEASE Left     CHOLECYSTECTOMY      COLONOSCOPY      OTHER SURGICAL HISTORY  10/19/2020    Cholecystectomy    OTHER SURGICAL HISTORY  01/13/2021    Abdominoplasty    OTHER SURGICAL HISTORY  01/13/2021    Tubal ligation    TUBAL LIGATION         Medication Documentation Review Audit       Reviewed by Jada Laws PA-C (Physician Assistant) on 08/29/24 at 1336      Medication Order Taking? Sig Documenting Provider Last Dose Status   atorvastatin (Lipitor) 20 mg tablet [id number] Yes Take 1 tablet (20 mg) by mouth once daily. Sheldon Perez MD Taking Active   blood-glucose sensor (FreeStyle Dany 3 Sensor) device [id number] Yes Change sensor every 14 days Sheldon Perez MD Taking  Active   FREESTYLE LANCETS MISC [id number]  Test blood sugars twice daily as directed Historical Provider, MD  Active   FreeStyle Lite Strips strip [id number]  twice a day. Historical Provider, MD  Active   losartan (Cozaar) 50 mg tablet [id number] Yes Take 1 tablet (50 mg) by mouth once daily. Sheldon Perez MD Taking Active   multivitamin tablet [id number] Yes Take 1 tablet by mouth once daily. Historical Provider, MD Taking Active   semaglutide (Ozempic) 1 mg/dose (4 mg/3 mL) pen injector [id number] Yes Inject 1 mg under the skin 1 (one) time per week. Sheldon Perez MD Taking Active                    Allergies   Allergen Reactions    Dapagliflozin Hives    Dapagliflozin Propanediol Hives    Metformin Other     numbness on side of face    Pioglitazone Hives    Rosuvastatin Rash     Numbness in face       Review of Systems   Constitutional:  Negative for chills and fever.   HENT:  Negative for mouth sores, sinus pain and trouble swallowing.    Eyes:  Negative for discharge.   Respiratory:  Negative for shortness of breath and wheezing.    Cardiovascular:  Negative for chest pain.   Musculoskeletal:  Positive for arthralgias and joint swelling.   Skin:  Negative for pallor and wound.   All other systems reviewed and are negative.    Exam   On exam the left ring finger shows a lack of about 20 degrees of full flexion, she has about again a 30 to 40 degree flexion contracture of the left ring finger PIP joint.  No increasing numbness in the hand or intrinsic atrophy.  She has mild triggering of the right long finger, with mild tenderness of the right long A1 pulley tendon sheath.  Good pulses and sensation in the upper extremities otherwise.    Assessment   Encounter Diagnosis   Name Primary?    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  She is also having  some mild right long finger triggering and they can work on this in hand therapy as well.  We did discuss a possible steroid injection in the future but she is a diabetic and we would like to avoid this if possible. Overall the function in the left hand is much improved after most recent surgery.  She can follow-up with us in 6 months.    Written by Jada Landin saw, evaluated, and treated the patient with the PA

## 2024-09-05 ENCOUNTER — DOCUMENTATION (OUTPATIENT)
Dept: OCCUPATIONAL THERAPY | Facility: HOSPITAL | Age: 64
End: 2024-09-05
Payer: COMMERCIAL

## 2024-09-10 ENCOUNTER — APPOINTMENT (OUTPATIENT)
Dept: OPHTHALMOLOGY | Facility: CLINIC | Age: 64
End: 2024-09-10
Payer: COMMERCIAL

## 2024-09-12 ENCOUNTER — APPOINTMENT (OUTPATIENT)
Dept: ORTHOPEDIC SURGERY | Facility: HOSPITAL | Age: 64
End: 2024-09-12
Payer: COMMERCIAL

## 2024-09-16 ENCOUNTER — HOSPITAL ENCOUNTER (OUTPATIENT)
Dept: RADIOLOGY | Facility: CLINIC | Age: 64
Discharge: HOME | End: 2024-09-16
Payer: COMMERCIAL

## 2024-09-16 VITALS — HEIGHT: 63 IN | BODY MASS INDEX: 30.04 KG/M2 | WEIGHT: 169.53 LBS

## 2024-09-16 DIAGNOSIS — Z12.31 ENCOUNTER FOR SCREENING MAMMOGRAM FOR BREAST CANCER: ICD-10-CM

## 2024-09-16 PROCEDURE — 77063 BREAST TOMOSYNTHESIS BI: CPT | Performed by: RADIOLOGY

## 2024-09-16 PROCEDURE — 77067 SCR MAMMO BI INCL CAD: CPT | Performed by: RADIOLOGY

## 2024-09-16 PROCEDURE — 77067 SCR MAMMO BI INCL CAD: CPT

## 2024-09-17 ENCOUNTER — APPOINTMENT (OUTPATIENT)
Dept: OPHTHALMOLOGY | Facility: CLINIC | Age: 64
End: 2024-09-17
Payer: COMMERCIAL

## 2024-09-20 ENCOUNTER — EVALUATION (OUTPATIENT)
Dept: OCCUPATIONAL THERAPY | Facility: HOSPITAL | Age: 64
End: 2024-09-20
Payer: COMMERCIAL

## 2024-09-20 DIAGNOSIS — M24.542 CONTRACTURE OF JOINT OF FINGER OF LEFT HAND DUE TO SCAR: ICD-10-CM

## 2024-09-20 DIAGNOSIS — L90.5 CONTRACTURE OF JOINT OF FINGER OF LEFT HAND DUE TO SCAR: ICD-10-CM

## 2024-09-20 PROCEDURE — L3912 HFO FLEXION GLOVE PRE OTS: HCPCS | Performed by: OCCUPATIONAL THERAPIST

## 2024-09-20 PROCEDURE — 97165 OT EVAL LOW COMPLEX 30 MIN: CPT | Mod: GO | Performed by: OCCUPATIONAL THERAPIST

## 2024-09-20 ASSESSMENT — PAIN - FUNCTIONAL ASSESSMENT: PAIN_FUNCTIONAL_ASSESSMENT: 0-10

## 2024-09-20 ASSESSMENT — ENCOUNTER SYMPTOMS
DEPRESSION: 0
OCCASIONAL FEELINGS OF UNSTEADINESS: 0
LOSS OF SENSATION IN FEET: 0

## 2024-09-20 ASSESSMENT — PAIN SCALES - GENERAL: PAINLEVEL_OUTOF10: 0 - NO PAIN

## 2024-09-30 NOTE — PROGRESS NOTES
ASSESSMENT/PLAN  1. Encounter for well woman exam with routine gynecological exam  Routine well woman visit.   Your exam was normal today.   A pap and HPV test was done. If you are signed onto the  MY CHART, you will be notified about your pap results through the MY CHART in 2-3 weeks.        2. Encounter for screening mammogram for breast cancer  Your clinical breast exam was normal.   A screening mammogram order has been placed for 2025.      SUBJECTIVE    PAP 6-15-20 NEG HPV NEG  MAMMO 2024  DEXA 22 Normal. T score 0  COLON  10 YEAR REPEAT    HPI    65 yo  for routine well woman visit.   Last visit 2023.  Menopausal since age 53. No bleeding.   Denies any major intervening medical or surgical issues since last visit.   Up to date with PCP, endocrine visits.  4 children, all local. 8 grandchildren.   FH mom  from breast CA age 52. Sister had endometrial cancer.  , non smoker, rare ETOH.     REVIEW OF SYSTEMS    Constitutional: no fever, no chills, no recent weight gain, no recent weight loss, no fatigue.   Eyes: no eye pain, no vision problems, no dryness of the eyes.   ENT: no hearing loss, no nosebleeds, no sinus congestion.   Cardiovascular: no chest pain, no palpitations.  Respiratory: no shortness of breath, no cough, no wheezing.   Gastrointestinal: no abdominal pain, no constipation, no nausea, no diarrhea, no vomiting.   Genitourinary: no pelvic pain, no dysuria, no urinary incontinence, no change in urinary frequency, no vaginal dryness, no vaginal itching,  no vaginal discharge, no unexplained vaginal bleeding, no lesion/sore.   Musculoskeletal: no back pain, no joint swelling, no leg edema.   Integumentary: no rashes, no skin lesions, no breast pain, no nipple discharge, no breast lump.   Neurological: no headache, no numbness, no dizziness.   Psychiatric: no sleep disturbances, no anxiety, no depression.   Endocrine: no hot flashes, no loss of hair, no hirsutism.  "  Hematologic/Lymphatic: no swollen glands, no tendency for easy bleeding, no tendency for easy bruising.      OBJECTIVE    /64   Ht 1.626 m (5' 4\")   Wt 86.2 kg (190 lb)   BMI 32.61 kg/m²      Physical Exam     Constitutional: Alert and in no acute distress. Well developed, well nourished   Head and Face: Head and face: normal   Eyes: Normal external exam - nonicteric sclera.  Ears, Nose, Mouth, and Throat: External inspection of ears and nose: normal   Neck: no neck asymmetry. Supple and thyroid not enlarged and there were no palpable thyroid nodules   Cardiovascular: Heart rate and rhythm were normal  Pulmonary: No respiratory distress and clear bilateral breath sounds   Chest: Breasts: normal appearance, no nipple discharge, no skin changes. Palpation of breasts and axillae: no palpable mass, no axillary lymphadenopathy   Abdomen: soft nontender; abdominoplasty scars unchanged. no abdominal mass palpated, no organomegaly and no hernias   Genitourinary: external genitalia: normal appearing vulva and labia without lesions. No inguinal lymphadenopathy,    Urethra: normal.   Bladder: normal on palpation.   Perianal area: normal   Vagina: normal, without significant discharge, no lesions.  Cervix: Normal appearing without lesions.  Uterus: Normal, mobile, nontender.  Right and left adnexa/parametria: Normal  Skin: normal skin color and pigmentation, normal skin turgor, and no rash  Psychiatric: alert and oriented x 3., affect normal to patient baseline and mood: appropriate        Charu Clay MD    "

## 2024-10-01 ENCOUNTER — APPOINTMENT (OUTPATIENT)
Dept: OBSTETRICS AND GYNECOLOGY | Facility: CLINIC | Age: 64
End: 2024-10-01
Payer: COMMERCIAL

## 2024-10-01 VITALS
DIASTOLIC BLOOD PRESSURE: 64 MMHG | WEIGHT: 190 LBS | HEIGHT: 64 IN | BODY MASS INDEX: 32.44 KG/M2 | SYSTOLIC BLOOD PRESSURE: 110 MMHG

## 2024-10-01 DIAGNOSIS — Z12.4 SCREENING FOR CERVICAL CANCER: ICD-10-CM

## 2024-10-01 DIAGNOSIS — Z12.31 ENCOUNTER FOR SCREENING MAMMOGRAM FOR BREAST CANCER: ICD-10-CM

## 2024-10-01 DIAGNOSIS — Z01.419 ENCOUNTER FOR WELL WOMAN EXAM WITH ROUTINE GYNECOLOGICAL EXAM: Primary | ICD-10-CM

## 2024-10-01 PROCEDURE — 87624 HPV HI-RISK TYP POOLED RSLT: CPT

## 2024-10-01 PROCEDURE — 3048F LDL-C <100 MG/DL: CPT | Performed by: OBSTETRICS & GYNECOLOGY

## 2024-10-01 PROCEDURE — 88142 CYTOPATH C/V THIN LAYER: CPT

## 2024-10-01 PROCEDURE — 3078F DIAST BP <80 MM HG: CPT | Performed by: OBSTETRICS & GYNECOLOGY

## 2024-10-01 PROCEDURE — 3061F NEG MICROALBUMINURIA REV: CPT | Performed by: OBSTETRICS & GYNECOLOGY

## 2024-10-01 PROCEDURE — 3044F HG A1C LEVEL LT 7.0%: CPT | Performed by: OBSTETRICS & GYNECOLOGY

## 2024-10-01 PROCEDURE — 3074F SYST BP LT 130 MM HG: CPT | Performed by: OBSTETRICS & GYNECOLOGY

## 2024-10-01 PROCEDURE — 3008F BODY MASS INDEX DOCD: CPT | Performed by: OBSTETRICS & GYNECOLOGY

## 2024-10-01 PROCEDURE — 1036F TOBACCO NON-USER: CPT | Performed by: OBSTETRICS & GYNECOLOGY

## 2024-10-01 PROCEDURE — 4010F ACE/ARB THERAPY RXD/TAKEN: CPT | Performed by: OBSTETRICS & GYNECOLOGY

## 2024-10-01 PROCEDURE — 99396 PREV VISIT EST AGE 40-64: CPT | Performed by: OBSTETRICS & GYNECOLOGY

## 2024-10-14 LAB
CYTOLOGY CMNT CVX/VAG CYTO-IMP: NORMAL
HPV HR 12 DNA GENITAL QL NAA+PROBE: NEGATIVE
HPV HR GENOTYPES PNL CVX NAA+PROBE: NEGATIVE
HPV16 DNA SPEC QL NAA+PROBE: NEGATIVE
HPV18 DNA SPEC QL NAA+PROBE: NEGATIVE
LAB AP HPV GENOTYPE QUESTION: YES
LAB AP HPV HR: NORMAL
LABORATORY COMMENT REPORT: NORMAL
LABORATORY COMMENT REPORT: NORMAL
MENSTRUAL HX REPORTED: NORMAL
PATH REPORT.TOTAL CANCER: NORMAL

## 2024-10-21 ENCOUNTER — APPOINTMENT (OUTPATIENT)
Dept: PRIMARY CARE | Facility: CLINIC | Age: 64
End: 2024-10-21
Payer: COMMERCIAL

## 2024-10-24 ENCOUNTER — DOCUMENTATION (OUTPATIENT)
Dept: OCCUPATIONAL THERAPY | Facility: HOSPITAL | Age: 64
End: 2024-10-24
Payer: COMMERCIAL

## 2024-10-24 ENCOUNTER — OFFICE VISIT (OUTPATIENT)
Dept: ORTHOPEDIC SURGERY | Facility: HOSPITAL | Age: 64
End: 2024-10-24
Payer: COMMERCIAL

## 2024-10-24 DIAGNOSIS — M25.811 IMPINGEMENT OF RIGHT SHOULDER: Primary | ICD-10-CM

## 2024-10-24 PROCEDURE — 20611 DRAIN/INJ JOINT/BURSA W/US: CPT | Mod: RT | Performed by: ORTHOPAEDIC SURGERY

## 2024-10-24 PROCEDURE — 99213 OFFICE O/P EST LOW 20 MIN: CPT | Performed by: ORTHOPAEDIC SURGERY

## 2024-10-24 PROCEDURE — 3044F HG A1C LEVEL LT 7.0%: CPT | Performed by: ORTHOPAEDIC SURGERY

## 2024-10-24 PROCEDURE — 3061F NEG MICROALBUMINURIA REV: CPT | Performed by: ORTHOPAEDIC SURGERY

## 2024-10-24 PROCEDURE — 2500000004 HC RX 250 GENERAL PHARMACY W/ HCPCS (ALT 636 FOR OP/ED): Performed by: ORTHOPAEDIC SURGERY

## 2024-10-24 PROCEDURE — 1036F TOBACCO NON-USER: CPT | Performed by: ORTHOPAEDIC SURGERY

## 2024-10-24 PROCEDURE — 3048F LDL-C <100 MG/DL: CPT | Performed by: ORTHOPAEDIC SURGERY

## 2024-10-24 PROCEDURE — 4010F ACE/ARB THERAPY RXD/TAKEN: CPT | Performed by: ORTHOPAEDIC SURGERY

## 2024-10-24 RX ORDER — TRIAMCINOLONE ACETONIDE 40 MG/ML
40 INJECTION, SUSPENSION INTRA-ARTICULAR; INTRAMUSCULAR
Status: COMPLETED | OUTPATIENT
Start: 2024-10-24 | End: 2024-10-24

## 2024-10-24 RX ORDER — LIDOCAINE HYDROCHLORIDE 10 MG/ML
3 INJECTION, SOLUTION INFILTRATION; PERINEURAL
Status: COMPLETED | OUTPATIENT
Start: 2024-10-24 | End: 2024-10-24

## 2024-10-24 ASSESSMENT — ENCOUNTER SYMPTOMS
SHORTNESS OF BREATH: 0
WHEEZING: 0
FEVER: 0
FATIGUE: 0
CHILLS: 0

## 2024-10-24 ASSESSMENT — PAIN SCALES - GENERAL: PAINLEVEL_OUTOF10: 8

## 2024-10-24 ASSESSMENT — PAIN - FUNCTIONAL ASSESSMENT: PAIN_FUNCTIONAL_ASSESSMENT: 0-10

## 2024-10-24 NOTE — PROGRESS NOTES
Reason for Appointment  Chief Complaint   Patient presents with    Right Shoulder - Pain     History of Present Illness  Patient is a 64 y.o. female here today for follow-up evaluation of right shoulder pain, she has had on and off bursitis over the last couple years that she has had her issues with her hand.  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 09/20/2024    Essential (primary) hypertension 01/05/2023    HTN (hypertension), benign    Hyperlipidemia, unspecified 01/07/2023    HLD (hyperlipidemia)    Type 2 diabetes mellitus without complications (Multi) 01/05/2023    Diabetes mellitus type 2, uncomplicated       Past Surgical History:   Procedure Laterality Date    CARPAL TUNNEL RELEASE Left     CHOLECYSTECTOMY      COLONOSCOPY      OTHER SURGICAL HISTORY  10/19/2020    Cholecystectomy    OTHER SURGICAL HISTORY  01/13/2021    Abdominoplasty    OTHER SURGICAL HISTORY  01/13/2021    Tubal ligation    TUBAL LIGATION         Medication Documentation Review Audit       Reviewed by Surinder Landin MD (Physician) on 10/24/24 at 1313      Medication Order Taking? Sig Documenting Provider Last Dose Status   atorvastatin (Lipitor) 20 mg tablet [id number] Yes Take 1 tablet (20 mg) by mouth once daily. Sheldon Perez MD Taking Active   blood-glucose sensor (FreeStyle Dany 3 Sensor) device [id number] Yes Change sensor every 14 days Sheldon Perez MD Taking Active   losartan (Cozaar) 50 mg tablet [id number] Yes Take 1 tablet (50 mg) by mouth once daily. Sheldon Perez MD Taking Active   multivitamin tablet  [id number] Yes Take 1 tablet by mouth once daily. Historical Provider, MD Taking Active   semaglutide (Ozempic) 1 mg/dose (4 mg/3 mL) pen injector [id number] Yes Inject 1 mg under the skin 1 (one) time per week. Sheldon Perez MD Taking Active                    Allergies   Allergen Reactions    Dapagliflozin Hives    Dapagliflozin Propanediol Hives    Metformin Other     numbness on side of face    Pioglitazone Hives    Pioglitazone Hcl GI Upset and Unknown     Facial numbness, SOB, wt gain, tongue felt swollen    Rosuvastatin Rash     Numbness in face       Review of Systems   Constitutional:  Negative for chills, fatigue and fever.   Respiratory:  Negative for shortness of breath and wheezing.    Cardiovascular:  Negative for chest pain and leg swelling.       Exam   On examination she has some decreased forward flexion but no gross adhesive capsulitis contracture markedly positive impingement sign but excellent rotator cuff strength and internal/external rotation good deltoid function good pulses good sensation distally  Assessment   Right shoulder impingement with bursitis  Plan   We perform 1 subacromial injection today, patient understands small risk of infection signs look for flare reaction and treatment over the next few days any issues call us immediately she can continue working on the hand but she has stabilized with overall good daily function    Patient ID: Shira Castaneda is a 64 y.o. female.    L Inj/Asp: R subacromial bursa on 10/24/2024 1:15 PM  Indications: pain  Details: 22 G needle, ultrasound-guided  Medications: 40 mg triamcinolone acetonide 40 mg/mL; 3 mL lidocaine 10 mg/mL (1 %)  Outcome: tolerated well, no immediate complications  Procedure, treatment alternatives, risks and benefits explained, specific risks discussed. Consent was given by the patient. Immediately prior to procedure a time out was called to verify the correct patient, procedure, equipment, support staff and site/side  marked as required. Patient was prepped and draped in the usual sterile fashion.

## 2024-11-11 ENCOUNTER — LAB (OUTPATIENT)
Dept: LAB | Facility: LAB | Age: 64
End: 2024-11-11
Payer: COMMERCIAL

## 2024-11-11 ENCOUNTER — PATIENT MESSAGE (OUTPATIENT)
Dept: ENDOCRINOLOGY | Facility: CLINIC | Age: 64
End: 2024-11-11

## 2024-11-11 DIAGNOSIS — E11.9 TYPE 2 DIABETES MELLITUS WITHOUT COMPLICATION, WITHOUT LONG-TERM CURRENT USE OF INSULIN (MULTI): Primary | ICD-10-CM

## 2024-11-11 DIAGNOSIS — E11.9 TYPE 2 DIABETES MELLITUS WITHOUT COMPLICATION, WITHOUT LONG-TERM CURRENT USE OF INSULIN (MULTI): ICD-10-CM

## 2024-11-11 DIAGNOSIS — E78.5 DYSLIPIDEMIA: ICD-10-CM

## 2024-11-11 LAB
ALBUMIN SERPL BCP-MCNC: 4.5 G/DL (ref 3.4–5)
ALP SERPL-CCNC: 61 U/L (ref 33–136)
ALT SERPL W P-5'-P-CCNC: 19 U/L (ref 7–45)
ANION GAP SERPL CALC-SCNC: 11 MMOL/L (ref 10–20)
AST SERPL W P-5'-P-CCNC: 17 U/L (ref 9–39)
BILIRUB SERPL-MCNC: 1.2 MG/DL (ref 0–1.2)
BUN SERPL-MCNC: 25 MG/DL (ref 6–23)
CALCIUM SERPL-MCNC: 9.8 MG/DL (ref 8.6–10.6)
CHLORIDE SERPL-SCNC: 106 MMOL/L (ref 98–107)
CO2 SERPL-SCNC: 27 MMOL/L (ref 21–32)
CREAT SERPL-MCNC: 0.79 MG/DL (ref 0.5–1.05)
CREAT UR-MCNC: 121.2 MG/DL (ref 20–320)
EGFRCR SERPLBLD CKD-EPI 2021: 84 ML/MIN/1.73M*2
ERYTHROCYTE [DISTWIDTH] IN BLOOD BY AUTOMATED COUNT: 12.6 % (ref 11.5–14.5)
EST. AVERAGE GLUCOSE BLD GHB EST-MCNC: 123 MG/DL
GLUCOSE SERPL-MCNC: 95 MG/DL (ref 74–99)
HBA1C MFR BLD: 5.9 %
HCT VFR BLD AUTO: 46.2 % (ref 36–46)
HGB BLD-MCNC: 15.3 G/DL (ref 12–16)
MCH RBC QN AUTO: 29.3 PG (ref 26–34)
MCHC RBC AUTO-ENTMCNC: 33.1 G/DL (ref 32–36)
MCV RBC AUTO: 88 FL (ref 80–100)
MICROALBUMIN UR-MCNC: <7 MG/L
MICROALBUMIN/CREAT UR: NORMAL MG/G{CREAT}
NRBC BLD-RTO: 0 /100 WBCS (ref 0–0)
PLATELET # BLD AUTO: 152 X10*3/UL (ref 150–450)
POTASSIUM SERPL-SCNC: 4.6 MMOL/L (ref 3.5–5.3)
PROT SERPL-MCNC: 7.3 G/DL (ref 6.4–8.2)
RBC # BLD AUTO: 5.23 X10*6/UL (ref 4–5.2)
SODIUM SERPL-SCNC: 139 MMOL/L (ref 136–145)
TSH SERPL-ACNC: 2.05 MIU/L (ref 0.44–3.98)
WBC # BLD AUTO: 8.7 X10*3/UL (ref 4.4–11.3)

## 2024-11-11 PROCEDURE — 36415 COLL VENOUS BLD VENIPUNCTURE: CPT

## 2024-11-11 PROCEDURE — 80053 COMPREHEN METABOLIC PANEL: CPT

## 2024-11-11 PROCEDURE — 84443 ASSAY THYROID STIM HORMONE: CPT

## 2024-11-11 PROCEDURE — 83036 HEMOGLOBIN GLYCOSYLATED A1C: CPT

## 2024-11-11 PROCEDURE — 85027 COMPLETE CBC AUTOMATED: CPT

## 2024-11-11 PROCEDURE — 82570 ASSAY OF URINE CREATININE: CPT

## 2024-11-11 PROCEDURE — 82043 UR ALBUMIN QUANTITATIVE: CPT

## 2024-11-12 ENCOUNTER — APPOINTMENT (OUTPATIENT)
Dept: ENDOCRINOLOGY | Facility: CLINIC | Age: 64
End: 2024-11-12
Payer: COMMERCIAL

## 2024-11-12 VITALS — HEIGHT: 64 IN | BODY MASS INDEX: 30.73 KG/M2 | WEIGHT: 180 LBS

## 2024-11-12 DIAGNOSIS — E11.9 TYPE 2 DIABETES MELLITUS WITHOUT COMPLICATION, WITHOUT LONG-TERM CURRENT USE OF INSULIN (MULTI): Primary | ICD-10-CM

## 2024-11-12 DIAGNOSIS — E78.5 DYSLIPIDEMIA: ICD-10-CM

## 2024-11-12 DIAGNOSIS — M65.331 ACQUIRED TRIGGER FINGER OF RIGHT MIDDLE FINGER: ICD-10-CM

## 2024-11-12 PROCEDURE — 4010F ACE/ARB THERAPY RXD/TAKEN: CPT | Performed by: INTERNAL MEDICINE

## 2024-11-12 PROCEDURE — 3044F HG A1C LEVEL LT 7.0%: CPT | Performed by: INTERNAL MEDICINE

## 2024-11-12 PROCEDURE — 3008F BODY MASS INDEX DOCD: CPT | Performed by: INTERNAL MEDICINE

## 2024-11-12 PROCEDURE — 95251 CONT GLUC MNTR ANALYSIS I&R: CPT | Performed by: INTERNAL MEDICINE

## 2024-11-12 PROCEDURE — 3048F LDL-C <100 MG/DL: CPT | Performed by: INTERNAL MEDICINE

## 2024-11-12 PROCEDURE — 1036F TOBACCO NON-USER: CPT | Performed by: INTERNAL MEDICINE

## 2024-11-12 PROCEDURE — 3062F POS MACROALBUMINURIA REV: CPT | Performed by: INTERNAL MEDICINE

## 2024-11-12 PROCEDURE — 99214 OFFICE O/P EST MOD 30 MIN: CPT | Performed by: INTERNAL MEDICINE

## 2024-11-12 RX ORDER — SEMAGLUTIDE 2.68 MG/ML
2 INJECTION, SOLUTION SUBCUTANEOUS
Qty: 9 ML | Refills: 3 | Status: SHIPPED | OUTPATIENT
Start: 2024-11-12

## 2024-11-12 NOTE — PROGRESS NOTES
Consults    Reason For Consult  Diabetes     History Of Present Illness  Shira Castaneda is a 64 y.o. female presenting with diabetes type 2 .        Had shoulder injection that affected her BG  Ozempic 1 mg weekly        Seen ophthalmology 6/2024         she had hand surgery and multiple infection around September 2022   during all this time his BG has been between 120-160 BG                  she did see ophthalmology without any known retinopathy   she is tolerating ozempic      She is eating healthy             her history includes                                                   Type 2 diabetes for many years , she has had reaction to many different drugs, she could not                                          tolerated Actos. , she could not tolerate metformin                                                Hyperlipidemia. 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 no  Any personal history of radiation to your head/neck? 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    Surgical History  She has a past surgical history that  includes Other surgical history (10/19/2020); Other surgical history (01/13/2021); Other surgical history (01/13/2021); Tubal ligation; Cholecystectomy; Colonoscopy; and Carpal tunnel release (Left).     Social History  She reports that she has never smoked. She has never used smokeless tobacco. She reports that she does not currently use alcohol. She reports that she does not use drugs.    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 09/20/2024    Essential (primary) hypertension 01/05/2023    HTN (hypertension), benign    Hyperlipidemia, unspecified 01/07/2023    HLD (hyperlipidemia)    Type 2 diabetes mellitus without complications (Multi) 01/05/2023    Diabetes mellitus type 2, uncomplicated       Past Surgical History:   Procedure Laterality Date    CARPAL TUNNEL RELEASE Left     CHOLECYSTECTOMY      COLONOSCOPY      OTHER SURGICAL HISTORY  10/19/2020    Cholecystectomy    OTHER SURGICAL HISTORY  01/13/2021    Abdominoplasty    OTHER SURGICAL HISTORY  01/13/2021    Tubal ligation    TUBAL LIGATION         Social History     Socioeconomic History    Marital status:      Spouse name: Not on file    Number of children: Not on file    Years of education: Not on file    Highest education level: Not on file   Occupational History    Not on file   Tobacco Use    Smoking status: Never    Smokeless tobacco: Never   Vaping Use    Vaping status: Never Used   Substance and Sexual Activity    Alcohol use: Not Currently    Drug use: Never    Sexual activity: Defer   Other Topics Concern    Not on file   Social History Narrative    Not on file     Social Drivers of Health  "    Financial Resource Strain: Not on file   Food Insecurity: Not on file   Transportation Needs: Not on file   Physical Activity: Not on file   Stress: Not on file   Social Connections: Not on file   Intimate Partner Violence: Not on file   Housing Stability: Not on file        Physical Exam     ROS, PMH, FH/SH, surgical history and allergies have been reviewed.    Last Recorded Vitals  Height 1.626 m (5' 4\"), weight 81.6 kg (180 lb).    Relevant Results  Results from last 7 days   Lab Units 11/11/24  1135   GLUCOSE mg/dL 95        If you would like to pull in Medications, type .meds     If you would like to pull in Lab results for the last 24 hours, type .[id number]    If you would like to pull in specific Lab results, type .ll     If you would like to pull in Imaging results, type .imgrslt :99}  Lab Review  Lab Results   Component Value Date    BILITOT 1.2 11/11/2024    CALCIUM 9.8 11/11/2024    CO2 27 11/11/2024     11/11/2024    CREATININE 0.79 11/11/2024    GLUCOSE 95 11/11/2024    ALKPHOS 61 11/11/2024    K 4.6 11/11/2024    PROT 7.3 11/11/2024     11/11/2024    AST 17 11/11/2024    ALT 19 11/11/2024    BUN 25 (H) 11/11/2024    ANIONGAP 11 11/11/2024    MG 1.9 12/06/2022    PHOS 2.5 12/06/2022    ALBUMIN 4.5 11/11/2024    GFRF >90 06/19/2023     Lab Results   Component Value Date    TRIG 131 05/30/2024    CHOL 176 05/30/2024    LDLCALC 85 05/30/2024    HDL 64.6 05/30/2024     Lab Results   Component Value Date    HGBA1C 5.9 (H) 11/11/2024    HGBA1C 5.7 (H) 05/30/2024    HGBA1C 5.3 12/12/2023     The 10-year ASCVD risk score (Evan DENTON, et al., 2019) is: 7.2%    Values used to calculate the score:      Age: 64 years      Sex: Female      Is Non- : No      Diabetic: Yes      Tobacco smoker: No      Systolic Blood Pressure: 110 mmHg      Is BP treated: Yes      HDL Cholesterol: 64.6 mg/dL      Total Cholesterol: 176 mg/dL       Assessment/Plan   Problem List Items Addressed " This Visit             ICD-10-CM    Diabetes mellitus type 2, uncomplicated (Multi) - Primary E11.9    Relevant Medications    semaglutide (Ozempic) 2 mg/dose (8 mg/3 mL) pen injector    Other Relevant Orders    CT cardiac scoring wo IV contrast    Follow Up In Endocrinology    Dyslipidemia E78.5    Relevant Medications    semaglutide (Ozempic) 2 mg/dose (8 mg/3 mL) pen injector    Other Relevant Orders    CT cardiac scoring wo IV contrast    Follow Up In Endocrinology     Other Visit Diagnoses         Codes    Acquired trigger finger of right middle finger     M65.331    Relevant Medications    semaglutide (Ozempic) 2 mg/dose (8 mg/3 mL) pen injector    Other Relevant Orders    CT cardiac scoring wo IV contrast    Follow Up In Endocrinology            Steroid injection caused a sensation of feeling off  Her BG was ok at the time  I will screen for CAC   Cont atorvastatin  Continue arb  Agree with increasing ozempic to 2 mg weekly     Rtc in 6-8 months      I spent a total of 30+ minutes on the date of the service which included preparing to see the patient, face-to-face patient care, completing clinical documentation, obtaining and / or reviewing separately obtained history, counseling and educating the patient/family/caregiver, ordering medications, tests, or procedures, communicating with other healthcare providers (not separately reported), independently interpreting results, not separately reported, and communicating results to the patient/family/caregiver, real-time telemedicine visit using audiovisual technology with patient's verbal consent.  Name and date of birth verified.        Sheldon Perez MD

## 2024-12-11 ENCOUNTER — APPOINTMENT (OUTPATIENT)
Dept: OPHTHALMOLOGY | Facility: CLINIC | Age: 64
End: 2024-12-11
Payer: COMMERCIAL

## 2024-12-11 DIAGNOSIS — H52.03 HYPEROPIA OF BOTH EYES: ICD-10-CM

## 2024-12-11 DIAGNOSIS — E11.3293 TYPE 2 DIABETES MELLITUS WITH BOTH EYES AFFECTED BY MILD NONPROLIFERATIVE RETINOPATHY WITHOUT MACULAR EDEMA, WITHOUT LONG-TERM CURRENT USE OF INSULIN: Primary | ICD-10-CM

## 2024-12-11 DIAGNOSIS — H25.813 COMBINED FORMS OF AGE-RELATED CATARACT OF BOTH EYES: ICD-10-CM

## 2024-12-11 DIAGNOSIS — H52.221 REGULAR ASTIGMATISM OF RIGHT EYE: ICD-10-CM

## 2024-12-11 DIAGNOSIS — H52.4 PRESBYOPIA: ICD-10-CM

## 2024-12-11 PROCEDURE — 92014 COMPRE OPH EXAM EST PT 1/>: CPT | Performed by: OPHTHALMOLOGY

## 2024-12-11 ASSESSMENT — REFRACTION_WEARINGRX
OD_ADD: +2.50
OD_SPHERE: +2.50
OD_AXIS: 030
OS_CYLINDER: -0.25
OS_AXIS: 140
OS_SPHERE: +0.25
OS_ADD: +2.50
OD_CYLINDER: -1.75

## 2024-12-11 ASSESSMENT — VISUAL ACUITY
METHOD: SNELLEN - LINEAR
OS_CC: 20/20-2
OD_CC: 20/20-1

## 2024-12-11 ASSESSMENT — REFRACTION_MANIFEST
OD_SPHERE: +2.50
OD_AXIS: 030
OS_SPHERE: +0.25
OD_CYLINDER: -2.00
OD_ADD: +2.50
OS_ADD: +2.50

## 2024-12-11 ASSESSMENT — SLIT LAMP EXAM - LIDS
COMMENTS: GOOD POSITION
COMMENTS: GOOD POSITION

## 2024-12-11 ASSESSMENT — EXTERNAL EXAM - RIGHT EYE: OD_EXAM: NORMAL

## 2024-12-11 ASSESSMENT — TONOMETRY
OD_IOP_MMHG: 13
OS_IOP_MMHG: 13
IOP_METHOD: GOLDMANN APPLANATION

## 2024-12-11 ASSESSMENT — EXTERNAL EXAM - LEFT EYE: OS_EXAM: NORMAL

## 2024-12-11 ASSESSMENT — CUP TO DISC RATIO
OS_RATIO: .3
OD_RATIO: .3

## 2024-12-11 ASSESSMENT — ENCOUNTER SYMPTOMS: EYES NEGATIVE: 1

## 2024-12-11 NOTE — PROGRESS NOTES
Assessment/Plan   Diagnoses and all orders for this visit:  Type 2 diabetes mellitus with both eyes affected by mild nonproliferative retinopathy without macular edema, without long-term current use of insulin  -pt was advised of the importance of good diabetes control and the importance of a yearly dilated diabetic exam    Combined forms of age-related cataract of both eyes  Not visually significant at the present time  continue to monitor    Hyperopia of both eyes  Regular astigmatism of right eye  Presbyopia  -pt gets her glasses from outside provider    Return for a dilated exam in  12   months or sooner if having any problems

## 2025-01-22 ENCOUNTER — OFFICE VISIT (OUTPATIENT)
Dept: PRIMARY CARE | Facility: CLINIC | Age: 65
End: 2025-01-22
Payer: COMMERCIAL

## 2025-01-22 ENCOUNTER — HOSPITAL ENCOUNTER (OUTPATIENT)
Dept: RADIOLOGY | Facility: CLINIC | Age: 65
Discharge: HOME | End: 2025-01-22
Payer: COMMERCIAL

## 2025-01-22 VITALS — OXYGEN SATURATION: 99 % | DIASTOLIC BLOOD PRESSURE: 80 MMHG | HEART RATE: 87 BPM | SYSTOLIC BLOOD PRESSURE: 130 MMHG

## 2025-01-22 DIAGNOSIS — S20.212A CONTUSION OF RIB ON LEFT SIDE, INITIAL ENCOUNTER: ICD-10-CM

## 2025-01-22 DIAGNOSIS — R07.81 RIB PAIN ON LEFT SIDE: ICD-10-CM

## 2025-01-22 DIAGNOSIS — S20.212A CONTUSION OF RIB ON LEFT SIDE, INITIAL ENCOUNTER: Primary | ICD-10-CM

## 2025-01-22 PROCEDURE — 71046 X-RAY EXAM CHEST 2 VIEWS: CPT

## 2025-01-22 PROCEDURE — 99214 OFFICE O/P EST MOD 30 MIN: CPT | Performed by: STUDENT IN AN ORGANIZED HEALTH CARE EDUCATION/TRAINING PROGRAM

## 2025-01-22 PROCEDURE — 3079F DIAST BP 80-89 MM HG: CPT | Performed by: STUDENT IN AN ORGANIZED HEALTH CARE EDUCATION/TRAINING PROGRAM

## 2025-01-22 PROCEDURE — 1036F TOBACCO NON-USER: CPT | Performed by: STUDENT IN AN ORGANIZED HEALTH CARE EDUCATION/TRAINING PROGRAM

## 2025-01-22 PROCEDURE — 3075F SYST BP GE 130 - 139MM HG: CPT | Performed by: STUDENT IN AN ORGANIZED HEALTH CARE EDUCATION/TRAINING PROGRAM

## 2025-01-22 PROCEDURE — 4010F ACE/ARB THERAPY RXD/TAKEN: CPT | Performed by: STUDENT IN AN ORGANIZED HEALTH CARE EDUCATION/TRAINING PROGRAM

## 2025-01-22 PROCEDURE — 71046 X-RAY EXAM CHEST 2 VIEWS: CPT | Performed by: RADIOLOGY

## 2025-01-22 RX ORDER — LIDOCAINE 50 MG/G
1 PATCH TOPICAL DAILY
Qty: 7 PATCH | Refills: 0 | Status: SHIPPED | OUTPATIENT
Start: 2025-01-22 | End: 2026-01-22

## 2025-01-22 RX ORDER — IVERMECTIN 10 MG/G
CREAM TOPICAL
COMMUNITY
Start: 2025-01-21

## 2025-01-22 ASSESSMENT — PATIENT HEALTH QUESTIONNAIRE - PHQ9
1. LITTLE INTEREST OR PLEASURE IN DOING THINGS: NOT AT ALL
2. FEELING DOWN, DEPRESSED OR HOPELESS: NOT AT ALL
SUM OF ALL RESPONSES TO PHQ9 QUESTIONS 1 AND 2: 0

## 2025-01-22 NOTE — PROGRESS NOTES
Subjective   Patient ID: Shira Castaneda is a 64 y.o. female who presents for Fall on Ice/Rib Pain.    HPI   Fall/rib contusion and pain  Patient presents today with complaints of left-sided rib cage pain  She admits of falling on Sunday on the icy ground.  Landed on the left side, but had her purse on that side that likely helped with the impact.  Patient was able to move, and get up on herself, denied any hitting her head or other body parts.  Denies any pain in her hip or shoulder area ,or the left upper extremity.  She has a pain on the left side rib cage that get worst with coughing.  Admits of recovering from a viral infection contracted 7-10 days ago, and has some mild occasional cough for the moment.  Denies any shortness of breath.  Has been using Advil and Tylenol which has helped with the pain.      Review of Systems  All pertinent positive symptoms are included in the history of present illness.    All other systems have been reviewed and are negative and noncontributory to this patient's current ailments.    Objective   /80 (BP Location: Left arm, Patient Position: Sitting, BP Cuff Size: Adult)   Pulse 87   SpO2 99%     Physical Exam  CONSTITUTIONAL - well nourished, well developed, looks like stated age, in no acute distress, not ill-appearing, and not tired appearing  SKIN - normal skin color and pigmentation, normal skin turgor without rash, lesions, or nodules visualized  HEAD - no trauma, normocephalic  EYES - normal external exam  CHEST -no distressed breathing, good effort, normal breath sound thoroughly, there is a small bruising area 5 cm in diameter in the left side lower rib cage area, mildly tender to palpation  EXTREMITIES - no edema, no deformities  NEUROLOGICAL - normal balance, normal motor, no ataxia  PSYCHIATRIC - alert, pleasant and cordial, age-appropriate    Assessment/Plan   Diagnoses and all orders for this visit:  Contusion of rib on left side, initial encounter  -      XR chest 2 views; Future  -     lidocaine (Lidoderm) 5 % patch; Place 1 patch over 12 hours on the skin once daily. Apply to painful area 12 hours per day, remove for 12 hours.  Rib pain on left side  -     lidocaine (Lidoderm) 5 % patch; Place 1 patch over 12 hours on the skin once daily. Apply to painful area 12 hours per day, remove for 12 hours.  Patient is a 64-year-old female past medical history of well-controlled diabetes, who presented in the office after sustaining a mechanical fall and hurting her left side of the rib cage.  She is taking on the clock pain medication including Tylenol and Advil which is helping controlling most of the pain.  We ordered a chest x-ray to rule out any rib fracture or lung contusion that might be included as differential diagnosis at this moment.  Will continue to focus on pain management, since there is no shortness of breath and she is saturating 99% on room air.  Recommend continue Tylenol and Advil and prescribed lidocaine patches  Instructed to call our office for evaluation if symptoms do not improve or getting worse in the next 7-10 days.    Thank you for letting us be a part of your care team.  Please call the office if you have further questions or concerns regarding your care    Otherwise, please follow-up in 1-2 months for continued care and refills as well as your yearly physical examination    Trinity Yusuf MD  PGY2, FM Resident

## 2025-01-24 NOTE — RESULT ENCOUNTER NOTE
Chest x-ray showed no acute injuries/findings/fractures    It did show a questionable 8 mm nodule within the left base    It may be wise that we follow-up to get a CT scan to further evaluate what this could be    We will discuss this further at the patient's physical examination within the next few months

## 2025-01-27 DIAGNOSIS — R91.1 LUNG NODULE: ICD-10-CM

## 2025-02-06 ENCOUNTER — HOSPITAL ENCOUNTER (OUTPATIENT)
Dept: RADIOLOGY | Facility: HOSPITAL | Age: 65
Discharge: HOME | End: 2025-02-06
Payer: COMMERCIAL

## 2025-02-06 ENCOUNTER — APPOINTMENT (OUTPATIENT)
Dept: RADIOLOGY | Facility: CLINIC | Age: 65
End: 2025-02-06
Payer: COMMERCIAL

## 2025-02-06 DIAGNOSIS — R91.1 LUNG NODULE: ICD-10-CM

## 2025-02-06 PROCEDURE — 71250 CT THORAX DX C-: CPT

## 2025-02-09 NOTE — RESULT ENCOUNTER NOTE
CT scan of the lungs noted and most likely benign calcified granuloma    There are a few tiny nodules measuring 3 mm or less in size    If the patient does not have any high risk for any lung disease, no further follow-up is necessary    If the patient would like to be fully thorough, we can continue monitoring every 12 months

## 2025-03-04 ENCOUNTER — HOSPITAL ENCOUNTER (OUTPATIENT)
Dept: RADIOLOGY | Facility: CLINIC | Age: 65
Discharge: HOME | End: 2025-03-04
Payer: COMMERCIAL

## 2025-03-04 DIAGNOSIS — E78.5 DYSLIPIDEMIA: ICD-10-CM

## 2025-03-04 DIAGNOSIS — E11.9 TYPE 2 DIABETES MELLITUS WITHOUT COMPLICATION, WITHOUT LONG-TERM CURRENT USE OF INSULIN (MULTI): ICD-10-CM

## 2025-03-04 DIAGNOSIS — M65.331 ACQUIRED TRIGGER FINGER OF RIGHT MIDDLE FINGER: ICD-10-CM

## 2025-03-04 PROCEDURE — 75571 CT HRT W/O DYE W/CA TEST: CPT

## 2025-03-05 ENCOUNTER — TELEPHONE (OUTPATIENT)
Dept: ENDOCRINOLOGY | Facility: CLINIC | Age: 65
End: 2025-03-05
Payer: COMMERCIAL

## 2025-03-05 DIAGNOSIS — E11.9 TYPE 2 DIABETES MELLITUS WITHOUT COMPLICATION, WITHOUT LONG-TERM CURRENT USE OF INSULIN (MULTI): Primary | ICD-10-CM

## 2025-03-05 DIAGNOSIS — R93.1 HIGH CORONARY ARTERY CALCIUM SCORE: ICD-10-CM

## 2025-03-05 DIAGNOSIS — E78.5 DYSLIPIDEMIA: ICD-10-CM

## 2025-03-05 NOTE — RESULT ENCOUNTER NOTE
Can you call her, her heart scan shows some increased risk for her heart disease. She should see preventive cardiology, I can order a consult for her, can you please ask if she wants me to do so.

## 2025-03-05 NOTE — TELEPHONE ENCOUNTER
Spoke with patient to give attached message from provider. No questions or concerns raised at the time. Patient verbalized understanding of results.       ----- Message from Sheldon Perez sent at 3/5/2025 12:32 PM EST -----  Can you call her, her heart scan shows some increased risk for her heart disease. She should see preventive cardiology, I can order a consult for her, can you please ask if she wants me to do so.

## 2025-03-14 ENCOUNTER — APPOINTMENT (OUTPATIENT)
Dept: CARDIOLOGY | Facility: HOSPITAL | Age: 65
End: 2025-03-14
Payer: COMMERCIAL

## 2025-04-05 DIAGNOSIS — M65.331 ACQUIRED TRIGGER FINGER OF RIGHT MIDDLE FINGER: ICD-10-CM

## 2025-04-05 DIAGNOSIS — E11.9 TYPE 2 DIABETES MELLITUS WITHOUT COMPLICATION, WITHOUT LONG-TERM CURRENT USE OF INSULIN: ICD-10-CM

## 2025-04-07 RX ORDER — LOSARTAN POTASSIUM 50 MG/1
50 TABLET ORAL DAILY
Qty: 90 TABLET | Refills: 1 | Status: SHIPPED | OUTPATIENT
Start: 2025-04-07

## 2025-04-09 DIAGNOSIS — E11.9 TYPE 2 DIABETES MELLITUS WITHOUT COMPLICATION, WITHOUT LONG-TERM CURRENT USE OF INSULIN: ICD-10-CM

## 2025-04-09 DIAGNOSIS — M65.331 ACQUIRED TRIGGER FINGER OF RIGHT MIDDLE FINGER: ICD-10-CM

## 2025-04-09 RX ORDER — BLOOD-GLUCOSE SENSOR
EACH MISCELLANEOUS
Qty: 6 EACH | Refills: 1 | Status: SHIPPED | OUTPATIENT
Start: 2025-04-09

## 2025-04-15 NOTE — PROGRESS NOTES
Primary Care Physician: Paramjit Jimenes DO  Date of Visit: 2025  8:40 AM EDT      Chief Complaint:     Pt referred by Dr. Perez for high CAC score  Accompanied by     HPI / Summary:   Shira Castaneda is a 64 y.o. female with history of type 2 diabetes, coronary artery disease based on recent coronary calcium score of 406 here for further evaluation.  Reports no prior cardiac history.  Longstanding diabetes which is under control with hemoglobin A1c of 5.9% on Ozempic.  She denies any chest pain or pressure.  No dyspnea, lightheadedness, palpitations, presyncope or syncope.  No regular exercise.  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  But does think she could go up 4-5 flights of stairs without any issue.  She is currently tolerating atorvastatin 20 mg daily, losartan 50 mg daily and Ozempic.    Father with ?h/o MI. One brother with h/o heart issues but no stents    Last Cardiology Tests:  EC2025: NSR, normal ECG    Echo:    Cath:      Stress Test:    Cardiac Imaging: 3/4/25  1. Coronary artery calcium score of 405.89 (, Lcx 271, RCA 9.5)  2. Findings consistent with remote granulomatous exposure with hilar  and left lower lobe granulomas.            Past Medical History:  Past Medical History:   Diagnosis Date    Contracture of joint of finger of left hand 2024    Essential (primary) hypertension 2023    HTN (hypertension), benign    Hyperlipidemia, unspecified 2023    HLD (hyperlipidemia)    Type 2 diabetes mellitus without complications (Multi) 2023    Diabetes mellitus type 2, uncomplicated        Past Surgical History:  Past Surgical History:   Procedure Laterality Date    CARPAL TUNNEL RELEASE Left     CHOLECYSTECTOMY      COLONOSCOPY      OTHER SURGICAL HISTORY  10/19/2020    Cholecystectomy    OTHER SURGICAL HISTORY  2021    Abdominoplasty    OTHER SURGICAL HISTORY  2021    Tubal ligation    TUBAL LIGATION             Social History:  She reports that she has never smoked. She has never used smokeless tobacco. She reports that she does not currently use alcohol. She reports that she does not use drugs.    Family History:  family history includes Breast cancer (age of onset: 50) in her mother; Cancer in her sister; Diabetes in her brother and mother.      Allergies:  Allergies   Allergen Reactions    Dapagliflozin Hives    Dapagliflozin Propanediol Hives    Metformin Other     numbness on side of face    Pioglitazone Hives    Pioglitazone Hcl Unknown and GI Upset     Facial numbness, SOB, wt gain, tongue felt swollen    Rosuvastatin Rash     Numbness in face       Outpatient Medications:  Current Outpatient Medications   Medication Instructions    atorvastatin (LIPITOR) 20 mg, oral, Daily    blood-glucose sensor (FreeStyle Dany 3 Sensor) device USE AS DIRECTED TO MONITOR BLOOD SUGAR - CHANGE SENSOR EVERY 14 DAYS    ivermectin 1 % cream     lidocaine (Lidoderm) 5 % patch 1 patch, transdermal, Daily, Apply to painful area 12 hours per day, remove for 12 hours.    losartan (COZAAR) 50 mg, oral, Daily    multivitamin tablet 1 tablet, Daily    Ozempic 2 mg, subcutaneous, Once Weekly       Review of Systems:  A complete 10 point ROS was performed and is negative except for HPI    Physical Exam:  GENERAL: alert, cooperative, pleasant, in no acute distress  SKIN: warm, dry, no rash.  NECK: no JVD, no ANIYA  CARDIAC: Regular rate and rhythm with no rubs, murmurs, or gallops  CHEST: Normal respiratory efforts, lungs clear to auscultation bilaterally.  ABDOMEN: soft, nontender, nondistended  EXTREMITIES: no edema  NEURO: Alert and oriented x 3.  Grossly normal.  Moves all 4 extremities.    Vitals:    04/18/25 0849   BP: 116/70   BP Location: Right arm   Patient Position: Sitting   Pulse: 75   SpO2: 96%   Weight: 81.2 kg (179 lb)     Wt Readings from Last 5 Encounters:   11/12/24 81.6 kg (180 lb)   10/01/24 86.2 kg (190 lb)  "  09/16/24 76.9 kg (169 lb 8.5 oz)   11/01/23 76.9 kg (169 lb 8.5 oz)   10/24/23 79 kg (174 lb 2.6 oz)     Body mass index is 30.73 kg/m².        Last Labs:  CMP:  Recent Labs     11/11/24  1135 05/30/24  1105 12/12/23  1123    139 141   K 4.6 4.3 4.6    103 105   CO2 27 27 26   ANIONGAP 11 13 15   BUN 25* 16 21   CREATININE 0.79 0.76 0.67   EGFR 84 89 >90   GLUCOSE 95 112* 116*     Recent Labs     11/11/24  1135 05/30/24  1105 12/12/23  1123   ALBUMIN 4.5 4.5 4.8   ALKPHOS 61 67 76   ALT 19 23 28   AST 17 22 25   BILITOT 1.2 1.4* 1.2     CBC:  Recent Labs     11/11/24  1135 10/24/23  0925 12/26/22  1630   WBC 8.7 6.2 5.7   HGB 15.3 14.7 13.4   HCT 46.2* 43.4 40.1    171 173   MCV 88 87 89     COAG: No results for input(s): \"INR\", \"DDIMERVTE\" in the last 45503 hours.  ENDO:  Recent Labs     11/11/24  1135 05/30/24  1105 12/12/23  1123 10/17/23  1328 06/19/23  1322 12/06/22  0448 12/13/21  1537   TSH 2.05  --  2.94  --  2.20  --  1.32   HGBA1C 5.9* 5.7* 5.3 5.5 8.5*   < > 8.3*    < > = values in this interval not displayed.      CARDIAC: No results for input(s): \"CKMB\", \"TROPHS\", \"BNP\" in the last 37797 hours.    No lab exists for component: \"CK\", \"CKMBP\"  Recent Labs     05/30/24  1105 12/12/23  1123 10/17/23  1328 06/19/23  1322 12/13/21  1537 10/19/20  0928   CHOL 176 185 144 171 127 169   LDLF  --   --   --  89 64 77   LDLCALC 85 92 68  --   --   --    HDL 64.6 62.8 50.8 48.5 42.9 56.9   TRIG 131 149 127 169* 100 177*               Assessment/Plan   64-year-old female with controlled diabetes type 2, HTN, dyslipidemia and recently diagnosed coronary atherosclerosis on elevated CAC score.  She has no concerning ischemic symptoms.  Reviewed based on score of over 400 reasonable to get baseline exercise stress ECG which we have ordered.  If this is negative we will continue to focus on primary prevention that includes heart healthy eating (which she is already doing) and increasing her regular " exercise.  BP is well-controlled on losartan.  Diabetes is well-managed by Dr. Gallagher.  LDL goal ideally less than 70.  I am recommending increasing atorvastatin to 40 mg daily and she has upcoming labs in a few months.  Also recommend low-dose aspirin 81 mg as the benefits likely outweigh risks in her instance.  If unremarkable stress test, we will plan to follow-up with her in about 1 year.  She knows to contact me if develops any concerning symptoms.      Followup Appts:  Future Appointments   Date Time Provider Department Center   5/5/2025  8:00 AM [facility] STRESS 1 [id number] [facility]   9/17/2025  8:00 AM [facility] WARNER 6 [id number] [facility]   9/30/2025 10:30 AM Sheldon Perez MD [id number] [facility]   12/17/2025  1:00 PM Ramona Muñoz MD [id number] [facility]   4/20/2026  1:40 PM Lei Lan DO [id number] [facility]           ____________________________________________________________  Lei Lan DO  [facility]  [facility]

## 2025-04-17 ENCOUNTER — DOCUMENTATION (OUTPATIENT)
Dept: OCCUPATIONAL THERAPY | Facility: HOSPITAL | Age: 65
End: 2025-04-17
Payer: COMMERCIAL

## 2025-04-18 ENCOUNTER — OFFICE VISIT (OUTPATIENT)
Dept: CARDIOLOGY | Facility: CLINIC | Age: 65
End: 2025-04-18
Payer: COMMERCIAL

## 2025-04-18 VITALS
BODY MASS INDEX: 30.73 KG/M2 | SYSTOLIC BLOOD PRESSURE: 116 MMHG | HEART RATE: 75 BPM | DIASTOLIC BLOOD PRESSURE: 70 MMHG | WEIGHT: 179 LBS | OXYGEN SATURATION: 96 %

## 2025-04-18 DIAGNOSIS — R93.1 HIGH CORONARY ARTERY CALCIUM SCORE: ICD-10-CM

## 2025-04-18 DIAGNOSIS — Z71.89 ENCOUNTER FOR CARDIAC RISK COUNSELING: Primary | ICD-10-CM

## 2025-04-18 DIAGNOSIS — E78.5 DYSLIPIDEMIA: ICD-10-CM

## 2025-04-18 DIAGNOSIS — E11.9 TYPE 2 DIABETES MELLITUS WITHOUT COMPLICATION, WITHOUT LONG-TERM CURRENT USE OF INSULIN: ICD-10-CM

## 2025-04-18 LAB
ATRIAL RATE: 76 BPM
P AXIS: 63 DEGREES
P OFFSET: 193 MS
P ONSET: 145 MS
PR INTERVAL: 158 MS
Q ONSET: 224 MS
QRS COUNT: 13 BEATS
QRS DURATION: 74 MS
QT INTERVAL: 400 MS
QTC CALCULATION(BAZETT): 450 MS
QTC FREDERICIA: 432 MS
R AXIS: 38 DEGREES
T AXIS: 65 DEGREES
T OFFSET: 424 MS
VENTRICULAR RATE: 76 BPM

## 2025-04-18 PROCEDURE — 4010F ACE/ARB THERAPY RXD/TAKEN: CPT | Performed by: INTERNAL MEDICINE

## 2025-04-18 PROCEDURE — 99204 OFFICE O/P NEW MOD 45 MIN: CPT | Performed by: INTERNAL MEDICINE

## 2025-04-18 PROCEDURE — 3074F SYST BP LT 130 MM HG: CPT | Performed by: INTERNAL MEDICINE

## 2025-04-18 PROCEDURE — 99214 OFFICE O/P EST MOD 30 MIN: CPT | Performed by: INTERNAL MEDICINE

## 2025-04-18 PROCEDURE — 93005 ELECTROCARDIOGRAM TRACING: CPT | Performed by: INTERNAL MEDICINE

## 2025-04-18 PROCEDURE — 1036F TOBACCO NON-USER: CPT | Performed by: INTERNAL MEDICINE

## 2025-04-18 PROCEDURE — 3078F DIAST BP <80 MM HG: CPT | Performed by: INTERNAL MEDICINE

## 2025-04-18 RX ORDER — ATORVASTATIN CALCIUM 20 MG/1
40 TABLET, FILM COATED ORAL DAILY
Start: 2025-04-18

## 2025-04-18 RX ORDER — ASPIRIN 81 MG/1
81 TABLET ORAL DAILY
Qty: 90 TABLET | Refills: 3 | Status: SHIPPED | OUTPATIENT
Start: 2025-04-18 | End: 2026-04-18

## 2025-04-18 NOTE — PATIENT INSTRUCTIONS
Today we reviewed that you have a significantly elevated coronary artery calcium score.    This puts you in the moderate to higher risk category of heart attack or stroke over the next 10 years.    I would recommend following testing: stress ecg    We also focus on primary prevention which means we recommend  -No smoking  -Diabetes if present is controlled (HgA1c levels <7%  - blood pressure if present is controlled (goal <130/80 mmHg)  -heart healthy eating (as described in detail below) and   -focus on lowering cholesterol levels with an LDL cholesterol goal of less than 70.     You do not ever need another coronary calcium score as it will not change decision making in my opinion.    Diet recommendations:  1. EAT a mostly plant based diet (vegetarian or vegan) like the Mediterranean diet. By adhering to this diet you significantly reduce your risk of heart attack, stroke, diabetes, cancer and kidney disease. There is also increased chance of you getting off medications. Handout provided.  2. Avoid processed foods  3. If you are not confident in making a major diet shift at least consider…   a. Reducing or remove sugary drinks( pop, juices) and drink more water   b. Avoid cheese   c. Avoid fast food.   d. Minimal (1 drink) or no alcohol consumption  e. Eat more fruits and vegetables. Eating more fruit will NOT make diabetes worse or increase  chances of getting diabetes   f. Eat more fiber (>25 grams a day, and not from supplements).     Recommend follow-up 1 year  Will contact you once testing is completed

## 2025-04-30 ENCOUNTER — APPOINTMENT (OUTPATIENT)
Dept: ENDOCRINOLOGY | Facility: CLINIC | Age: 65
End: 2025-04-30
Payer: COMMERCIAL

## 2025-05-05 ENCOUNTER — HOSPITAL ENCOUNTER (OUTPATIENT)
Dept: CARDIOLOGY | Facility: HOSPITAL | Age: 65
Discharge: HOME | End: 2025-05-05
Payer: COMMERCIAL

## 2025-05-05 DIAGNOSIS — R93.1 HIGH CORONARY ARTERY CALCIUM SCORE: ICD-10-CM

## 2025-05-05 PROCEDURE — 93018 CV STRESS TEST I&R ONLY: CPT | Performed by: INTERNAL MEDICINE

## 2025-05-05 PROCEDURE — 93017 CV STRESS TEST TRACING ONLY: CPT

## 2025-05-05 PROCEDURE — 93016 CV STRESS TEST SUPVJ ONLY: CPT | Performed by: INTERNAL MEDICINE

## 2025-06-11 ENCOUNTER — PATIENT MESSAGE (OUTPATIENT)
Dept: ENDOCRINOLOGY | Facility: CLINIC | Age: 65
End: 2025-06-11
Payer: COMMERCIAL

## 2025-06-11 DIAGNOSIS — E11.9 TYPE 2 DIABETES MELLITUS WITHOUT COMPLICATION, WITHOUT LONG-TERM CURRENT USE OF INSULIN: ICD-10-CM

## 2025-06-11 RX ORDER — ATORVASTATIN CALCIUM 40 MG/1
40 TABLET, FILM COATED ORAL DAILY
Qty: 90 TABLET | Refills: 1 | Status: SHIPPED | OUTPATIENT
Start: 2025-06-11 | End: 2026-06-11

## 2025-07-03 ENCOUNTER — APPOINTMENT (OUTPATIENT)
Dept: ENDOCRINOLOGY | Facility: CLINIC | Age: 65
End: 2025-07-03
Payer: COMMERCIAL

## 2025-07-18 DIAGNOSIS — E11.9 TYPE 2 DIABETES MELLITUS WITHOUT COMPLICATION, WITHOUT LONG-TERM CURRENT USE OF INSULIN: ICD-10-CM

## 2025-07-18 RX ORDER — ATORVASTATIN CALCIUM 40 MG/1
40 TABLET, FILM COATED ORAL DAILY
Qty: 30 TABLET | Refills: 11 | Status: SHIPPED | OUTPATIENT
Start: 2025-07-18

## 2025-09-02 ENCOUNTER — APPOINTMENT (OUTPATIENT)
Dept: ENDOCRINOLOGY | Facility: CLINIC | Age: 65
End: 2025-09-02
Payer: COMMERCIAL

## 2025-09-30 ENCOUNTER — APPOINTMENT (OUTPATIENT)
Dept: ENDOCRINOLOGY | Facility: CLINIC | Age: 65
End: 2025-09-30
Payer: COMMERCIAL

## 2025-12-17 ENCOUNTER — APPOINTMENT (OUTPATIENT)
Dept: OPHTHALMOLOGY | Facility: CLINIC | Age: 65
End: 2025-12-17
Payer: COMMERCIAL

## (undated) DEVICE — DRESSING, NON-ADHERENT, 3 X 3 IN, STERILE

## (undated) DEVICE — Device

## (undated) DEVICE — SOLUTION, CHLORHEXIDINE, 4%, 4OZ

## (undated) DEVICE — SUTURE, PROLENE 4-0, 18IN, PS2, BLUE MONO

## (undated) DEVICE — GLOVE, SURGICAL, PROTEXIS PI , 7.5, PF, LF

## (undated) DEVICE — CUFF, TOURNIQUET, 18 X 4, DUAL PORT/SNGL BLADDER, DISP, LF

## (undated) DEVICE — BLANKET, LOWER BODY, VHA PLUS, ADULT

## (undated) DEVICE — TRAY, DRY PREP, PREMIUM

## (undated) DEVICE — GOWN, SURGICAL, SIRUS, NON REINFORCED, LARGE